# Patient Record
Sex: MALE | Race: WHITE | Employment: UNEMPLOYED | URBAN - METROPOLITAN AREA
[De-identification: names, ages, dates, MRNs, and addresses within clinical notes are randomized per-mention and may not be internally consistent; named-entity substitution may affect disease eponyms.]

---

## 2017-03-07 RX ORDER — DICLOFENAC SODIUM 10 MG/G
GEL TOPICAL
Qty: 500 G | Refills: 5 | Status: SHIPPED | OUTPATIENT
Start: 2017-03-07 | End: 2017-04-06

## 2017-03-23 ENCOUNTER — OFFICE VISIT (OUTPATIENT)
Dept: PAIN MANAGEMENT | Age: 57
End: 2017-03-23

## 2017-03-23 VITALS
DIASTOLIC BLOOD PRESSURE: 92 MMHG | BODY MASS INDEX: 30.71 KG/M2 | HEART RATE: 76 BPM | SYSTOLIC BLOOD PRESSURE: 155 MMHG | WEIGHT: 214 LBS

## 2017-03-23 DIAGNOSIS — M79.18 MUSCULOSKELETAL PAIN: ICD-10-CM

## 2017-03-23 DIAGNOSIS — M43.16 SPONDYLOLISTHESIS OF LUMBAR REGION: ICD-10-CM

## 2017-03-23 DIAGNOSIS — M47.816 LUMBAR FACET ARTHROPATHY: ICD-10-CM

## 2017-03-23 DIAGNOSIS — M51.26 LUMBAR DISC HERNIATION: ICD-10-CM

## 2017-03-23 DIAGNOSIS — Z79.899 ENCOUNTER FOR LONG-TERM (CURRENT) DRUG USE: ICD-10-CM

## 2017-03-23 DIAGNOSIS — G89.29 INSOMNIA SECONDARY TO CHRONIC PAIN: ICD-10-CM

## 2017-03-23 DIAGNOSIS — M96.1 POSTLAMINECTOMY SYNDROME, LUMBAR REGION: ICD-10-CM

## 2017-03-23 DIAGNOSIS — M48.061 LUMBAR FORAMINAL STENOSIS: ICD-10-CM

## 2017-03-23 DIAGNOSIS — M25.512 CHRONIC LEFT SHOULDER PAIN: ICD-10-CM

## 2017-03-23 DIAGNOSIS — R20.2 NUMBNESS AND TINGLING IN LEFT HAND: ICD-10-CM

## 2017-03-23 DIAGNOSIS — Z79.899 ENCOUNTER FOR LONG-TERM (CURRENT) USE OF MEDICATIONS: Primary | ICD-10-CM

## 2017-03-23 DIAGNOSIS — G89.29 CHRONIC LEFT SHOULDER PAIN: ICD-10-CM

## 2017-03-23 DIAGNOSIS — R20.0 NUMBNESS AND TINGLING IN LEFT HAND: ICD-10-CM

## 2017-03-23 DIAGNOSIS — M75.40 SHOULDER IMPINGEMENT SYNDROME, UNSPECIFIED LATERALITY: ICD-10-CM

## 2017-03-23 DIAGNOSIS — M77.8 SHOULDER ENTHESOPATHY, UNSPECIFIED LATERALITY: ICD-10-CM

## 2017-03-23 DIAGNOSIS — R53.81 OTHER MALAISE AND FATIGUE: ICD-10-CM

## 2017-03-23 DIAGNOSIS — R53.83 OTHER MALAISE AND FATIGUE: ICD-10-CM

## 2017-03-23 DIAGNOSIS — G47.01 INSOMNIA SECONDARY TO CHRONIC PAIN: ICD-10-CM

## 2017-03-23 LAB
ALCOHOL UR POC: NORMAL
AMPHETAMINES UR POC: NEGATIVE
BARBITURATES UR POC: NEGATIVE
BENZODIAZEPINES UR POC: NEGATIVE
BUPRENORPHINE UR POC: NORMAL
CANNABINOIDS UR POC: NEGATIVE
CARISOPRODOL UR POC: NORMAL
COCAINE UR POC: NEGATIVE
FENTANYL UR POC: NORMAL
MDMA/ECSTASY UR POC: NEGATIVE
METHADONE UR POC: NEGATIVE
METHAMPHETAMINE UR POC: NEGATIVE
METHYLPHENIDATE UR POC: NEGATIVE
OPIATES UR POC: NEGATIVE
OXYCODONE UR POC: NEGATIVE
PHENCYCLIDINE UR POC: NORMAL
PROPOXYPHENE UR POC: NORMAL
TRAMADOL UR POC: NORMAL
TRICYCLICS UR POC: NEGATIVE

## 2017-03-23 RX ORDER — HYDROCODONE BITARTRATE AND ACETAMINOPHEN 10; 325 MG/1; MG/1
1 TABLET ORAL
Qty: 120 TAB | Refills: 0 | Status: SHIPPED | OUTPATIENT
Start: 2017-03-25 | End: 2017-03-23 | Stop reason: SDUPTHER

## 2017-03-23 RX ORDER — METHADONE HYDROCHLORIDE 10 MG/1
20 TABLET ORAL EVERY 6 HOURS
Qty: 240 TAB | Refills: 0 | Status: SHIPPED | OUTPATIENT
Start: 2017-03-25 | End: 2017-03-23 | Stop reason: SDUPTHER

## 2017-03-23 RX ORDER — HYDROCODONE BITARTRATE AND ACETAMINOPHEN 10; 325 MG/1; MG/1
1 TABLET ORAL
Qty: 120 TAB | Refills: 0 | Status: SHIPPED | OUTPATIENT
Start: 2017-05-22 | End: 2017-06-20 | Stop reason: SDUPTHER

## 2017-03-23 RX ORDER — HYDROCODONE BITARTRATE AND ACETAMINOPHEN 10; 325 MG/1; MG/1
1 TABLET ORAL
Qty: 120 TAB | Refills: 0 | Status: SHIPPED | OUTPATIENT
Start: 2017-04-23 | End: 2017-03-23 | Stop reason: SDUPTHER

## 2017-03-23 RX ORDER — METHADONE HYDROCHLORIDE 10 MG/1
20 TABLET ORAL EVERY 6 HOURS
Qty: 240 TAB | Refills: 0 | Status: SHIPPED | OUTPATIENT
Start: 2017-05-22 | End: 2017-06-20 | Stop reason: SDUPTHER

## 2017-03-23 RX ORDER — METHADONE HYDROCHLORIDE 10 MG/1
20 TABLET ORAL EVERY 6 HOURS
Qty: 240 TAB | Refills: 0 | Status: SHIPPED | OUTPATIENT
Start: 2017-04-23 | End: 2017-03-23 | Stop reason: SDUPTHER

## 2017-03-23 NOTE — MR AVS SNAPSHOT
Visit Information Date & Time Provider Department Dept. Phone Encounter #  
 3/23/2017  3:00 PM Jesica Sloan [address] [place] [address] for Pain Management [phone] [id number] Follow-up Instructions Return in about 3 months (around 6/23/2017). Follow-up and Disposition History Your Appointments 4/13/2017  3:30 PM  
EMG with Colin Sims MD  
[address] [place] [address] Pain Management [facility]) Appt Note: Also schedule left upper extremity EMG with ALFONSO gross  Dx: R20.2  
 [address] [facility] [address]  
[phone] [address]  
  
    
 6/20/2017  1:00 PM  
Follow Up with Colin Sims MD  
[address] [place] [address] Pain Management [facility]) Appt Note: return in 3 months [address] [facility] [address]  
[phone]  [facility] [address] Upcoming Health Maintenance Date Due DTaP/Tdap/Td series (1 - Tdap) 10/24/1982 FOBT Q 1 YEAR AGE 50-75 10/24/2011 INFLUENZA AGE 9 TO ADULT 8/1/2016 Allergies as of 3/23/2017  Review Complete On: 3/23/2017 By: Georgiana Babinski, LPN No Known Allergies Current Immunizations  Never Reviewed No immunizations on file. Not reviewed this visit You Were Diagnosed With   
  
 Codes Comments Encounter for long-term (current) use of medications    -  Primary ICD-10-CM: Q24.266 ICD-9-CM: V58.69 Encounter for long-term (current) drug use     ICD-10-CM: Z79.899 ICD-9-CM: V58.69 Shoulder enthesopathy, unspecified laterality     ICD-10-CM: M75.80 ICD-9-CM: 726.10 Other malaise and fatigue     ICD-10-CM: R53.81, R53.83 ICD-9-CM: 780.79 Musculoskeletal pain     ICD-10-CM: M79.1 ICD-9-CM: 729.1 Postlaminectomy syndrome, lumbar region     ICD-10-CM: M96.1 ICD-9-CM: 722.83 Shoulder impingement syndrome, unspecified laterality     ICD-10-CM: M75.40 ICD-9-CM: 726.2 Spondylolisthesis of lumbar region     ICD-10-CM: M43.16 
ICD-9-CM: 738.4 Lumbar facet arthropathy (HCC)     ICD-10-CM: M12.88 ICD-9-CM: 721.3 Lumbar foraminal stenosis     ICD-10-CM: M99.83 ICD-9-CM: 724.02 Lumbar disc herniation     ICD-10-CM: M51.26 
ICD-9-CM: 722.10 Insomnia secondary to chronic pain     ICD-10-CM: G89.29, G47.01 
ICD-9-CM: 338.29, 327.01 Chronic left shoulder pain     ICD-10-CM: M25.512, G89.29 ICD-9-CM: 719.41, 338.29 Numbness and tingling in left hand     ICD-10-CM: R20.2 ICD-9-CM: 247. 0 Vitals BP Pulse Weight(growth percentile) BMI Smoking Status (!) 155/92 (BP 1 Location: Left arm, BP Patient Position: Sitting) 76 214 lb (97.1 kg) 30.71 kg/m2 Never Smoker Vitals History BMI and BSA Data Body Mass Index Body Surface Area 30.71 kg/m 2 2.19 m 2 Preferred Pharmacy Pharmacy Name Phone Shanta Nix Nancy [facility] 97. Your Updated Medication List  
  
   
This list is accurate as of: 3/23/17  3:57 PM.  Always use your most recent med list.  
  
  
  
  
 EFFEXOR  mg capsule Generic drug:  venlafaxine-SR Take  by mouth daily. * ergocalciferol 50,000 unit capsule Commonly known as:  ERGOCALCIFEROL  
TAKE 1 CAPSULE BY MOUTH ONCE A WEEK FOR 30 DAYS * ergocalciferol 50,000 unit capsule Commonly known as:  ERGOCALCIFEROL  
TAKE 1 CAPSULE BY MOUTH TWO TIMES A WEEK FOR 30 DAYS HYDROcodone-acetaminophen  mg tablet Commonly known as:  Chrissie3 Jayda Davis Take 1 Tab by mouth four (4) times daily as needed for Pain (breakthrough) for up to 30 days. Max Daily Amount: 4 Tabs. Indications: Pain Start taking on:  5/22/2017 * LIDODERM 5 % Generic drug:  lidocaine 1 Patch by TransDERmal route every twenty-four (24) hours. * lidocaine 5 % Commonly known as:  Yany Getting  
 3 Patches by TransDERmal route every twelve (12) hours. Apply patch to the affected area for 12 hours a day and remove for 12 hours a day. methadone 10 mg tablet Commonly known as:  DOLOPHINE Take 2 Tabs by mouth every six (6) hours for 30 days. Indications: Chronic Pain, Severe Pain Start taking on:  5/22/2017 NEURONTIN 600 mg tablet Generic drug:  gabapentin Take  by mouth three (3) times daily. VOLTAREN 1 % Gel Generic drug:  diclofenac TOPICALLY APPLY 4 GRAMS TO AFFECTED AREA FOUR TIMES A DAY FOR 30 DAYS  
  
 XANAX 0.5 mg tablet Generic drug:  ALPRAZolam  
Take  by mouth. ZANAFLEX 2 mg tablet Generic drug:  tiZANidine Take 1 mg by mouth as needed. ZANTAC 150 mg tablet Generic drug:  raNITIdine Take 150 mg by mouth two (2) times a day. * Notice: This list has 4 medication(s) that are the same as other medications prescribed for you. Read the directions carefully, and ask your doctor or other care provider to review them with you. Prescriptions Printed Refills  
 methadone (DOLOPHINE) 10 mg tablet 0 Starting on: 5/22/2017 Sig: Take 2 Tabs by mouth every six (6) hours for 30 days. Indications: Chronic Pain, Severe Pain Class: Print Route: Oral  
 HYDROcodone-acetaminophen (NORCO)  mg tablet 0 Starting on: 5/22/2017 Sig: Take 1 Tab by mouth four (4) times daily as needed for Pain (breakthrough) for up to 30 days. Max Daily Amount: 4 Tabs. Indications: Pain Class: Print Route: Oral  
  
We Performed the Following AMB POC DRUG SCREEN () [ \[facility] [place] [facility]\""] DRUG SCREEN [LJF99877 Custom] REFERRAL TO ORTHOPEDICS [DRL975 Custom] Comments:  
 Mr. Ann Zambrano requests an evaluation of persistent left shoulder pain. He had arthroscopic shoulder surgery (right) with Dr. Charles Morgan several years ago, and reports excellent relief of similar symptoms. Thank you in advance for again seeing this pleasant patient. Follow-up Instructions Return in about 3 months (around 6/23/2017). Referral Information Referral ID Referred By Referred To  
  
 [id number] Sandra Mccarthy MD   
   [address] [place] [address] Lorena Hamilton Phone: [phone] Fax: [phone] Visits Status Start Date End Date 12 New Request 3/23/17 3/23/18 If your referral has a status of pending review or denied, additional information will be sent to support the outcome of this decision. Patient Instructions Plan: 
Continue same medications as prescribed for chronic pain We will arrange an EMG to evaluate left hand numbness Consider B6 supplementation ([email or web address] is a reputable vitamin retailer) We will also refer you to Dr. Odell Palmer for evaluation of worsening left shoulder pain Follow up in 3 months or sooner if needed Regular exercise and attention to emotional health and diet remain the most effective ways to treat chronic pain of all kinds--keep up the great work! You may contact me with questions or concerns through [address] Patient Instructions History Introducing [facility] & HEALTH SERVICES! Dear Demetrice Mg: Thank you for requesting a [employer] account. Our records indicate that you already have an active [employer] account. You can access your account anytime at [email or web address] Did you know that you can access your hospital and ER discharge instructions at any time in [employer]? You can also review all of your test results from your hospital stay or ER visit. Additional Information If you have questions, please visit the Frequently Asked Questions section of the [employer] website at [email or web address] Remember, [employer] is NOT to be used for urgent needs. For medical emergencies, dial 911. Now available from your iPhone and Android! Please provide this summary of care documentation to your next provider. Your primary care clinician is listed as MARIO GRIDER. If you have any questions after today's visit, please call [phone].

## 2017-03-23 NOTE — PROGRESS NOTES
HISTORY OF PRESENT ILLNESS  Mynor Sauceda is a 55 y.o. male. Patient presents for follow up of chronic, severe, widespread pain due to shoulder impingement syndrome, lumbar postlaminectomy syndrome, and knee osteoarthritis. He also has many elements of fibromyalgia, including poor sleep, daytime fatigue, memory problems, and anxiety. He is present with his wife. He reports that he is trying to integrate more exercise and social interactions to his day to day activities, to hopefully improve his pain and quality of life. 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 Sleep is still poor, which is chronic for him, and has persisted in spite of multiple medications and improvements in sleep hygiene practices. He complains of worsening left shoulder pain over the past several weeks. Pain predominates at the anterolateral aspect of the shoulder near the [facility] [place] by [facility] joint and is worse with extension and overhead extension. He reports that this pain is very similar to pain he had in the right shoulder several years ago, and ultimately had successful arthroscopic surgery to \"clean out the joint\". We discussed that these symptoms are consistent with [facility] [place] arthritis/impingement syndrome. He requests to go back to Dr. Charles Morgan for evaluation and management. He also complains of worsening left hand numbness for the past three month. This tends to worsen during the night and with prolonged use of the arm. He denies overt  strength weakness, but does note that it is interfering with his ability to play guitar. He asks to have this evaluated via EMG. Wrist bracing and B6 supplementation were discussed. He continues to endorse persistent pain in the thoracic spine over the past several months, in addition to chronic lumbar pain. This pain is described as point-tender, aching, and stabbing.  When the pain flares in the thoracic spine, he will often notice stabbing, sharp pain at the base of the sternum near the xyphoid process. Recent MRI of the thoracic spine, which revealed mild multilevel degenerative changes. We discussed treatment options at length, and advised him to consider procedures such as epidurals or RFA if pain does not improve. Pt reports average of 5-7/10 pain scale most days. Medications continue to work well for pain control overall. Pooja Funez is tolerating medications well, with no untoward side effects noted. He is able to stay more active with less discomfort with these current doses. The patient reports an average of 50% relief with this regimen. Most recent UDS and  were consistent with prescribed medications. Pill counts are appropriate. He is informed of side effects, risks, and benefits of this regimen, and emphasizes that he derives a significant improvement in functionality and quality of life, and notes that non-opioid medications and therapies in the past have not offered significant benefit. He denies new or worsening insomnia or constipation issues. He denies any falls, injuries, or hospitalizations since the last visit. A total of 40 minutes was spent with the patient of which more than 50% of the time was spent counseling the patient. HPI--see above    ROS  Constitutional: Negative. HENT: Positive for neck pain. Negative for hearing loss. Eyes: Negative. Respiratory: Negative. Cardiovascular: Negative. Negative for chest pain, palpitations and leg swelling. Gastrointestinal: Negative. Negative for nausea, vomiting, abdominal pain, diarrhea and constipation. Genitourinary: Negative. Musculoskeletal: Positive for myalgias, back pain and joint pain (R shoulder, knees, hands). Skin: Negative. Neurological: Positive for tingling (R leg, resolved). Negative for dizziness, seizures, loss of consciousness and headaches.    Psychiatric/Behavioral: Positive for depression (on Effexor). Negative for suicidal ideas, hallucinations and memory loss. The patient has insomnia (pain related). Physical Exam  Constitutional: He is oriented to person, place, and time. He appears distressed. HENT:   Head: Normocephalic and atraumatic. Right Ear: External ear normal.   Left Ear: External ear normal.   Nose: Nose normal.   Mouth/Throat: Oropharynx is clear and moist. No oropharyngeal exudate. Eyes: Conjunctivae and EOM are normal. Pupils are equal, round, and reactive to light. Right eye exhibits no discharge. Left eye exhibits no discharge. No scleral icterus. Neck: Spinous process tenderness and muscular tenderness (spasms) present. Decreased range of motion present. No thyromegaly present. Cardiovascular: Normal rate, regular rhythm and normal heart sounds. Pulmonary/Chest: Effort normal and breath sounds normal. No respiratory distress. He has no wheezes. He has no rales. Abdominal: Soft. He exhibits no distension. There is no tenderness. There is no rebound and no guarding. Musculoskeletal: He exhibits tenderness at anterolateral aspect of left shoulder. No loss of ROM. Pain with overhead extension. No crepitus noted       Lumbar back: He exhibits decreased range of motion, tenderness, pain and spasm. tenderness at posteromedial aspect of left calcaneous. No swelling or redness noted. No fallen arch noted  Neurological: He is alert and oriented to person, place, and time. He has normal reflexes. No cranial nerve deficit or sensory deficit. He exhibits normal muscle tone. Gait abnormal. Coordination normal. Positive Tinel's sign on left wrist. Negative Phalen's. 5/5 muscle strength of left hand  ASSESSMENT and PLAN  Encounter Diagnoses   Name Primary?     Encounter for long-term (current) use of medications Yes    Encounter for long-term (current) drug use     Shoulder enthesopathy, unspecified laterality     Other malaise and fatigue     Musculoskeletal pain     Postlaminectomy syndrome, lumbar region     Shoulder impingement syndrome, unspecified laterality     Spondylolisthesis of lumbar region     Lumbar facet arthropathy (HCC)     Lumbar foraminal stenosis     Lumbar disc herniation     Insomnia secondary to chronic pain     Chronic left shoulder pain     Numbness and tingling in left hand        Plan:  Continue same medications as prescribed for chronic pain  We will arrange an EMG to evaluate left hand numbness  Consider B6 supplementation ([employer] is a [employer] vitamin retailer)  We will also refer you to Dr. Lurdes Rivera for evaluation of worsening left shoulder pain  Follow up in 3 months or sooner if needed  Regular exercise and attention to emotional health and diet remain the most effective ways to treat chronic pain of all kinds--keep up the great work! You may contact me with questions or concerns through [employer]   Plan:  Continue same medications as prescribed for chronic pain  We will arrange an EMG to evaluate left hand numbness  Consider B6 supplementation ([employer] is a [employer] vitamin retailer)  We will also refer you to Dr. Lurdes Rivera for evaluation of worsening left shoulder pain  Follow up in 3 months or sooner if needed  Regular exercise and attention to emotional health and diet remain the most effective ways to treat chronic pain of all kinds--keep up the great work!   You may contact me with questions or concerns through [address]

## 2017-03-23 NOTE — PATIENT INSTRUCTIONS
Plan:  Continue same medications as prescribed for chronic pain  We will arrange an EMG to evaluate left hand numbness  Consider B6 supplementation (www.[email or web address] is a reputable vitamin retailer)  We will also refer you to Dr. Darrell Cesar for evaluation of worsening left shoulder pain  Follow up in 3 months or sooner if needed  Regular exercise and attention to emotional health and diet remain the most effective ways to treat chronic pain of all kinds--keep up the great work!   You may contact me with questions or concerns through [address]

## 2017-04-13 ENCOUNTER — OFFICE VISIT (OUTPATIENT)
Dept: PAIN MANAGEMENT | Age: 57
End: 2017-04-13

## 2017-04-13 VITALS
DIASTOLIC BLOOD PRESSURE: 82 MMHG | WEIGHT: 215 LBS | HEART RATE: 79 BPM | SYSTOLIC BLOOD PRESSURE: 131 MMHG | BODY MASS INDEX: 30.85 KG/M2

## 2017-04-13 DIAGNOSIS — G56.03 BILATERAL CARPAL TUNNEL SYNDROME: Primary | ICD-10-CM

## 2017-04-13 NOTE — PROGRESS NOTES
See scanned report    [address] [place] [address],[address] PAIN MANAGEMENT  OFFICE PROCEDURE PROGRESS NOTE        Chart reviewed for the following:   Tomasa Artis MD, have reviewed the History, Physical and updated the Allergic reactions for Austen GODINEZ Ring     TIME OUT performed immediately prior to start of procedure:   Page Curling, M.D. have performed the following reviews on [facility] prior to the start of the procedure:            * Patient was identified by name and date of birth   * Agreement on procedure being performed was verified  * Risks and Benefits explained to the patient  * Procedure site verified and marked as necessary  * Patient was positioned for comfort  * Consent was signed and verified     Time: 4:34 PM       Date of procedure: 4/13/2017    Procedure performed by:  Chiara Gibson MD    Assisted by:patient    How tolerated by patient: tolerated the procedure well with no complications    Comments: none     Patient Label  Signature:_____________________________      EMG/NCV LUE: Mild - Mod CTS

## 2017-06-20 ENCOUNTER — OFFICE VISIT (OUTPATIENT)
Dept: PAIN MANAGEMENT | Age: 57
End: 2017-06-20

## 2017-06-20 VITALS — WEIGHT: 215 LBS | BODY MASS INDEX: 30.85 KG/M2

## 2017-06-20 DIAGNOSIS — G89.29 INSOMNIA SECONDARY TO CHRONIC PAIN: ICD-10-CM

## 2017-06-20 DIAGNOSIS — G47.01 INSOMNIA SECONDARY TO CHRONIC PAIN: ICD-10-CM

## 2017-06-20 DIAGNOSIS — M51.26 LUMBAR DISC HERNIATION: ICD-10-CM

## 2017-06-20 DIAGNOSIS — M48.061 LUMBAR FORAMINAL STENOSIS: ICD-10-CM

## 2017-06-20 DIAGNOSIS — M96.1 POSTLAMINECTOMY SYNDROME, LUMBAR REGION: Primary | ICD-10-CM

## 2017-06-20 DIAGNOSIS — M62.838 SPASM OF MUSCLE: ICD-10-CM

## 2017-06-20 DIAGNOSIS — Z79.899 ENCOUNTER FOR LONG-TERM (CURRENT) DRUG USE: ICD-10-CM

## 2017-06-20 DIAGNOSIS — G89.4 CHRONIC PAIN SYNDROME: ICD-10-CM

## 2017-06-20 DIAGNOSIS — M15.9 PRIMARY OSTEOARTHRITIS INVOLVING MULTIPLE JOINTS: ICD-10-CM

## 2017-06-20 DIAGNOSIS — M47.816 LUMBAR FACET ARTHROPATHY: ICD-10-CM

## 2017-06-20 RX ORDER — METHADONE HYDROCHLORIDE 10 MG/1
20 TABLET ORAL EVERY 6 HOURS
Qty: 240 TAB | Refills: 0 | Status: SHIPPED | OUTPATIENT
Start: 2017-08-18 | End: 2017-09-19 | Stop reason: SDUPTHER

## 2017-06-20 RX ORDER — NALOXONE HYDROCHLORIDE 4 MG/.1ML
4 SPRAY NASAL AS NEEDED
Qty: 1 BOX | Refills: 1 | Status: SHIPPED | OUTPATIENT
Start: 2017-06-20

## 2017-06-20 RX ORDER — HYDROCODONE BITARTRATE AND ACETAMINOPHEN 10; 325 MG/1; MG/1
1 TABLET ORAL
Qty: 120 TAB | Refills: 0 | Status: SHIPPED | OUTPATIENT
Start: 2017-07-20 | End: 2017-08-19

## 2017-06-20 RX ORDER — METHADONE HYDROCHLORIDE 10 MG/1
20 TABLET ORAL EVERY 6 HOURS
Qty: 240 TAB | Refills: 0 | Status: SHIPPED | OUTPATIENT
Start: 2017-06-22 | End: 2017-07-22

## 2017-06-20 RX ORDER — HYDROCODONE BITARTRATE AND ACETAMINOPHEN 10; 325 MG/1; MG/1
1 TABLET ORAL
Qty: 120 TAB | Refills: 0 | Status: SHIPPED | OUTPATIENT
Start: 2017-06-22 | End: 2017-07-22

## 2017-06-20 RX ORDER — METHADONE HYDROCHLORIDE 10 MG/1
20 TABLET ORAL EVERY 6 HOURS
Qty: 240 TAB | Refills: 0 | Status: SHIPPED | OUTPATIENT
Start: 2017-07-20 | End: 2017-08-19

## 2017-06-20 RX ORDER — HYDROCODONE BITARTRATE AND ACETAMINOPHEN 10; 325 MG/1; MG/1
1 TABLET ORAL
Qty: 120 TAB | Refills: 0 | Status: SHIPPED | OUTPATIENT
Start: 2017-08-18 | End: 2017-09-19 | Stop reason: SDUPTHER

## 2017-06-20 NOTE — PROGRESS NOTES
Nursing Notes    Patient presents to the office today in follow-up. Patient rates his pain at 7/10 on the numerical pain scale. Reviewed medications with counts as follows:    Rx Date filled Qty Dispensed Pill Count Last Dose Short   Methadone 10 5/24/17 240 32 6/20/17 no   norco 10 5/24/17 120 25 6/20/17 no       Comments:     POC UDS was not performed in office today    Any new labs or imaging since last appointment? YES, rib xray  Have you been to an emergency room (ER) or urgent care clinic since your last visit? NO            Have you been hospitalized since your last visit? NO     If yes, where, when, and reason for visit? Have you seen or consulted any other health care providers outside of the [address] [place] [address]  since your last visit? YES     If yes, where, when, and reason for visit? PCP after fall    Mr. Jerrod Rizvi has a reminder for a \"due or due soon\" health maintenance. I have asked that he contact his primary care provider for follow-up on this health maintenance.

## 2017-06-20 NOTE — MR AVS SNAPSHOT
Visit Information Date & Time Provider Department Dept. Phone Encounter #  
 6/20/2017  1:00 PM Chaya Pierce MD [address] [place] [address] for Pain Management [phone] Follow-up Instructions Return in about 3 months (around 9/20/2017). Upcoming Health Maintenance Date Due DTaP/Tdap/Td series (1 - Tdap) 10/24/1982 FOBT Q 1 YEAR AGE 50-75 10/24/2011 INFLUENZA AGE 9 TO ADULT 8/1/2017 Allergies as of 6/20/2017  Review Complete On: 6/20/2017 By: Chaya Pierce MD  
 No Known Allergies Current Immunizations  Never Reviewed No immunizations on file. Not reviewed this visit You Were Diagnosed With   
  
 Codes Comments Encounter for long-term (current) drug use    -  Primary ICD-10-CM: T55.125 ICD-9-CM: V58.69 Vitals Weight(growth percentile) BMI Smoking Status 215 lb (97.5 kg) 30.85 kg/m2 Never Smoker BMI and BSA Data Body Mass Index Body Surface Area  
 30.85 kg/m 2 2.19 m 2 Preferred Pharmacy Pharm[facility] Name Phone [facility]. Your Updated Medication List  
  
   
This list is accurate as of: 6/20/17  2:33 PM.  Always use your most recent med list.  
  
  
  
  
 EFFEXOR  mg capsule Generic drug:  venlafaxine-SR Take  by mouth daily. ergocalciferol 50,000 unit capsule Commonly known as:  ERGOCALCIFEROL  
TAKE 1 CAPSULE BY MOUTH ONCE A WEEK FOR 30 DAYS  
  
 * HYDROcodone-acetaminophen  mg tablet Commonly known as:  [place] Rivas Take 1 Tab by mouth four (4) times daily as needed for Pain (breakthrough) for up to 30 days. Max Daily Amount: 4 Tabs. Indications: Pain Start taking on:  6/22/2017  
  
 * HYDROcodone-acetaminophen  mg tablet Commonly known as:  [place] Rivas Take 1 Tab by mouth four (4) times daily as needed for Pain (breakthrough) for up to 30 days. Max Daily Amount: 4 Tabs. Indications: Pain Start taking on:  7/20/2017  
  
 * HYDROcodone-acetaminophen  mg tablet Commonly known as:  [place] Take 1 Tab by mouth four (4) times daily as needed for Pain (breakthrough) for up to 30 days. Max Daily Amount: 4 Tabs. Indications: Pain Start taking on:  8/18/2017 * LIDODERM 5 % Generic drug:  lidocaine 1 Patch by TransDERmal route every twenty-four (24) hours. * lidocaine 5 % Commonly known as:  LIDODERM  
3 Patches by TransDERmal route every twelve (12) hours. Apply patch to the affected area for 12 hours a day and remove for 12 hours a day. * methadone 10 mg tablet Commonly known as:  DOLOPHINE Take 2 Tabs by mouth every six (6) hours for 30 days. Indications: Chronic Pain, Severe Pain Start taking on:  6/22/2017 * methadone 10 mg tablet Commonly known as:  DOLOPHINE Take 2 Tabs by mouth every six (6) hours for 30 days. Indications: Chronic Pain, Severe Pain Start taking on:  7/20/2017 * methadone 10 mg tablet Commonly known as:  DOLOPHINE Take 2 Tabs by mouth every six (6) hours for 30 days. Indications: Chronic Pain, Severe Pain Start taking on:  8/18/2017  
  
 naloxone 4 mg/actuation Spry 4 mg by Nasal route as needed for up to 2 doses. Indications: OPIOID TOXICITY  
  
 NEURONTIN 600 mg tablet Generic drug:  gabapentin Take  by mouth three (3) times daily. XANAX 0.5 mg tablet Generic drug:  ALPRAZolam  
Take  by mouth. ZANAFLEX 2 mg tablet Generic drug:  tiZANidine Take 1 mg by mouth as needed. ZANTAC 150 mg tablet Generic drug:  raNITIdine Take 150 mg by mouth two (2) times a day. * Notice: This list has 8 medication(s) that are the same as other medications prescribed for you. Read the directions carefully, and ask your doctor or other care provider to review them with you. Prescriptions Printed Refills HYDROcodone-acetaminophen (NORCO)  mg tablet 0 Starting on: 2017 Sig: Take 1 Tab by mouth four (4) times daily as needed for Pain (breakthrough) for up to 30 days. Max Daily Amount: 4 Tabs. Indications: Pain Class: Print Route: Oral  
 methadone (DOLOPHINE) 10 mg tablet 0 Starting on: 2017 Sig: Take 2 Tabs by mouth every six (6) hours for 30 days. Indications: Chronic Pain, Severe Pain Class: Print Route: Oral  
 methadone (DOLOPHINE) 10 mg tablet 0 Starting on: 2017 Sig: Take 2 Tabs by mouth every six (6) hours for 30 days. Indications: Chronic Pain, Severe Pain Class: Print Route: Oral  
 HYDROcodone-acetaminophen (NORCO)  mg tablet 0 Starting on: 2017 Sig: Take 1 Tab by mouth four (4) times daily as needed for Pain (breakthrough) for up to 30 days. Max Daily Amount: 4 Tabs. Indications: Pain Class: Print Route: Oral  
 methadone (DOLOPHINE) 10 mg tablet 0 Starting on: 2017 Sig: Take 2 Tabs by mouth every six (6) hours for 30 days. Indications: Chronic Pain, Severe Pain Class: Print Route: Oral  
 HYDROcodone-acetaminophen (NORCO)  mg tablet 0 Starting on: 2017 Sig: Take 1 Tab by mouth four (4) times daily as needed for Pain (breakthrough) for up to 30 days. Max Daily Amount: 4 Tabs. Indications: Pain Class: Print Route: Oral  
 naloxone 4 mg/actuation spry 1 Si mg by Nasal route as needed for up to 2 doses. Indications: OPIOID TOXICITY Class: Print Route: Nasal  
  
We Performed the Following AMB POC EKG ROUTINE W/ 12 LEADS, INTER & REP [57284 CPT(R)] Follow-up Instructions Return in about 3 months (around 2017). Patient Instructions Current health maintenance issues were reviewed and the patient was advised to followup with his/her PCP for completion of these items. Introducing [facility]! Dear Patrick Ramirez: Thank you for requesting a [employer] account. Our records indicate that you already have an active [employer] account. You can access your account anytime at [email or web address] Did you know that you can access your hospital and ER discharge instructions at any time in [employer]? You can also review all of your test results from your hospital stay or ER visit. Additional Information If you have questions, please visit the Frequently Asked Questions section of the [employer] website at [email or web address] Remember, [employer] is NOT to be used for urgent needs. For medical emergencies, dial 911. Now available from your iPhone and Android! Please provide this summary of care documentation to your next provider. Your primary care clinician is listed as MARIO GRIDER. If you have any questions after today's visit, please call [phone].

## 2017-06-20 NOTE — PROGRESS NOTES
HISTORY OF PRESENT ILLNESS  Renny Navarro is a 55 y.o. male. HPI Patient presents for follow up of chronic, severe, widespread pain due to shoulder impingement syndrome, lumbar postlaminectomy syndrome, and knee osteoarthritis. He continues to endorse rather severe pain in the thoracic spine over the past several months. This pain is described as point-tender, aching, and stabbing. When the pain flares in the thoracic spine, he will often notice stabbing, sharp pain at the base of the sternum near the xyphoid process. We discussed that this may represent HNP or disc bulge/DDD of the thoracic spine producing radicular pain in the chest wall. We discussed his recent MRI of the thoracic spine, which revealed mild multilevel degenerative changes. We discussed treatment options at length, and advised him to consider procedures such as epidurals or RFA if pain does not improve. Pt reports average of 7/10 pain scale most days. Since last seen, he sustained a fall on the stairs resulting in fractured ribs and straining of the intercostal muscles. He is slowly improving. Medications offer little to modest pain relief. Renny Navarro is tolerating medications well, with no untoward side effects noted. He is able to stay more active with less discomfort with these current doses. The patient reports an average of 70% relief with this regimen. Most recent UDS and  were consistent with prescribed medications. Pill counts are appropriate. He is informed of side effects, risks, and benefits of this regimen, and emphasizes that he derives a significant improvement in functionality and quality of life, and notes that non-opioid medications and therapies in the past have not offered significant benefit. Pain level today 7 out of 10, outcome 14/28,(The lower the upper number, the better the outcome)  Physical activity and mobility as well as mood are fair, sleep is poor. No reported side effects.     A current review of the [facility] does not identify any inconsistency. Review of Systems   Constitutional: Negative. HENT: Negative for hearing loss. Eyes: Negative. Respiratory: Negative. Cardiovascular: Negative. Negative for chest pain, palpitations and leg swelling. Gastrointestinal: Negative. Negative for abdominal pain, constipation, diarrhea, nausea and vomiting. Genitourinary: Negative. Musculoskeletal: Positive for back pain, joint pain (R shoulder, knees, hands), myalgias and neck pain. Skin: Negative. Neurological: Positive for tingling (R leg, resolved). Negative for dizziness, seizures, loss of consciousness and headaches. Psychiatric/Behavioral: Positive for depression (on Effexor). Negative for hallucinations, memory loss and suicidal ideas. The patient has insomnia (pain related). All other systems reviewed and are negative. Physical Exam   Constitutional: He is oriented to person, place, and time. He appears distressed. HENT:   Head: Normocephalic and atraumatic. Right Ear: External ear normal.   Left Ear: External ear normal.   Nose: Nose normal.   Mouth/Throat: Oropharynx is clear and moist. No oropharyngeal exudate. Eyes: Conjunctivae and EOM are normal. Pupils are equal, round, and reactive to light. Right eye exhibits no discharge. Left eye exhibits no discharge. No scleral icterus. Neck: Spinous process tenderness and muscular tenderness (spasms) present. Decreased range of motion present. No thyromegaly present. Cardiovascular: Normal rate, regular rhythm and normal heart sounds. Pulmonary/Chest: Effort normal and breath sounds normal. No respiratory distress. He has no wheezes. He has no rales. Abdominal: Soft. He exhibits no distension. There is no tenderness. There is no rebound and no guarding. Musculoskeletal: He exhibits tenderness. Right shoulder: He exhibits decreased range of motion, tenderness and pain.         Left shoulder: He exhibits decreased range of motion, tenderness and pain. Right elbow: He exhibits decreased range of motion. Tenderness (diffuse) found. Left elbow: He exhibits decreased range of motion. Tenderness (crepitus) found. Right wrist: He exhibits decreased range of motion, tenderness, bony tenderness and crepitus. Left wrist: He exhibits decreased range of motion, tenderness, bony tenderness and crepitus. Right knee: He exhibits decreased range of motion (crepitus) and bony tenderness. Left knee: He exhibits decreased range of motion (crepitus) and bony tenderness. Right ankle: He exhibits decreased range of motion. Tenderness. Left ankle: He exhibits decreased range of motion. Tenderness. Lumbar back: He exhibits decreased range of motion, tenderness, pain and spasm. Neurological: He is alert and oriented to person, place, and time. He has normal reflexes. No cranial nerve deficit or sensory deficit. He exhibits normal muscle tone. Gait abnormal. Coordination normal.   Reflex Scores:       Tricep reflexes are 2+ on the right side and 2+ on the left side. Bicep reflexes are 2+ on the right side and 2+ on the left side. Brachioradialis reflexes are 2+ on the right side and 2+ on the left side. Patellar reflexes are 2+ on the right side and 2+ on the left side. Achilles reflexes are 2+ on the right side and 2+ on the left side. Skin: Skin is warm. No rash noted. Psychiatric: He has a normal mood and affect. His behavior is normal. Judgment and thought content normal.   Nursing note and vitals reviewed. ASSESSMENT and PLAN  Encounter Diagnoses   Name Primary?     Postlaminectomy syndrome, lumbar region Yes    Encounter for long-term (current) drug use     Spasm of muscle     Primary osteoarthritis involving multiple joints     Lumbar facet arthropathy (HCC)     Lumbar foraminal stenosis     Lumbar disc herniation     Insomnia secondary to chronic pain     Chronic pain syndrome      An EKG will be obtained today to monitor his chronic methadone usage. He will continue on his current analgesic regimen as this is providing good pain control with improved functionality and minimal side effects. Because the patient's current regimen places him/her at increased risk for possible overdose, a prescription for naloxone nasal spray is being provided. The patient understands that this medication is only to be used in the setting of a possible overdose and that inadvertent use of this medication could precipitate overt withdrawal.    3 month reassess him    No concerns are raised for misuse, abuse, or diversion. 1. Pain medications are prescribed with the objective of pain relief and improved physical and psychosocial function in this patient. 2. Counseled patient on proper use of prescribed medications and reviewed opioid contract. 3. Counseled patient about chronic medical conditions and their relationship to anxiety and depression and recommended mental health support as needed. 4. Reviewed with patient self-help tools, home exercise, and lifestyle changes to assist the patient in self-management of symptoms. 5. Advised patient to have a primary care provider to continue care for health maintenance and general medical conditions and support for referral to specialty care as needed. 6. Reviewed with patient the treatment plan, goals of treatment plan, and limitations of treatment plan, to include the potential for side effects from medications and procedures. If side effects occur, it is the responsibility of the patient to inform the clinic so that a change in the treatment plan can be made in a safe manner. The patient is advised that stopping prescribed medication may cause an increase in symptoms and possible medication withdrawal symptoms. The patient is informed an emergency room evaluation may be necessary if this occurs.   DISPOSITION: The patients condition and plan were discussed at length and all questions were answered. The patient agrees with the plan.     Counseling occupied > 50% of visit:  Total time: 40 minutes

## 2017-08-21 RX ORDER — TIZANIDINE 2 MG/1
TABLET ORAL
Qty: 90 TAB | Refills: 4 | Status: SHIPPED | OUTPATIENT
Start: 2017-08-21

## 2017-09-19 RX ORDER — METHADONE HYDROCHLORIDE 10 MG/1
20 TABLET ORAL EVERY 6 HOURS
Qty: 240 TAB | Refills: 0 | Status: SHIPPED | OUTPATIENT
Start: 2017-09-25 | End: 2017-10-13 | Stop reason: SDUPTHER

## 2017-09-19 RX ORDER — HYDROCODONE BITARTRATE AND ACETAMINOPHEN 10; 325 MG/1; MG/1
1 TABLET ORAL
Qty: 120 TAB | Refills: 0 | Status: SHIPPED | OUTPATIENT
Start: 2017-09-25 | End: 2017-10-13 | Stop reason: SDUPTHER

## 2017-09-19 NOTE — TELEPHONE ENCOUNTER
The pt was called to be informed that the provider will not be in the office for his appt. The pt was not available but I was able to speak to his spouse. She was on the pt's HIPPA list. The pt was rescheduled to 10/13/17 with [facility]. A  was obtained and the last fill date for his methadone and norco was 08/27/17. There were no aberrancies noted. The pt's wife states that he is tolerating his medication well at this time. He will be provided prescriptions to get him to this appt. The pt will need to provide a UDS when he comes to  his prescriptions.

## 2017-10-13 ENCOUNTER — OFFICE VISIT (OUTPATIENT)
Dept: PAIN MANAGEMENT | Age: 57
End: 2017-10-13

## 2017-10-13 VITALS
SYSTOLIC BLOOD PRESSURE: 144 MMHG | HEART RATE: 72 BPM | HEIGHT: 70 IN | BODY MASS INDEX: 30.78 KG/M2 | WEIGHT: 215 LBS | TEMPERATURE: 97.4 F | RESPIRATION RATE: 18 BRPM | DIASTOLIC BLOOD PRESSURE: 86 MMHG

## 2017-10-13 DIAGNOSIS — M51.26 LUMBAR DISC HERNIATION: ICD-10-CM

## 2017-10-13 DIAGNOSIS — M47.816 LUMBAR FACET ARTHROPATHY: ICD-10-CM

## 2017-10-13 DIAGNOSIS — M77.8 SHOULDER ENTHESOPATHY, UNSPECIFIED LATERALITY: ICD-10-CM

## 2017-10-13 DIAGNOSIS — G89.29 INSOMNIA SECONDARY TO CHRONIC PAIN: ICD-10-CM

## 2017-10-13 DIAGNOSIS — Z79.899 ENCOUNTER FOR LONG-TERM (CURRENT) DRUG USE: ICD-10-CM

## 2017-10-13 DIAGNOSIS — M43.16 SPONDYLOLISTHESIS OF LUMBAR REGION: ICD-10-CM

## 2017-10-13 DIAGNOSIS — M48.061 LUMBAR FORAMINAL STENOSIS: ICD-10-CM

## 2017-10-13 DIAGNOSIS — G47.01 INSOMNIA SECONDARY TO CHRONIC PAIN: ICD-10-CM

## 2017-10-13 DIAGNOSIS — Z79.899 ENCOUNTER FOR LONG-TERM (CURRENT) USE OF HIGH-RISK MEDICATION: Primary | ICD-10-CM

## 2017-10-13 DIAGNOSIS — M75.40 IMPINGEMENT SYNDROME OF SHOULDER REGION, UNSPECIFIED LATERALITY: ICD-10-CM

## 2017-10-13 LAB
ALCOHOL UR POC: NORMAL
AMPHETAMINES UR POC: NORMAL
BARBITURATES UR POC: NEGATIVE
BENZODIAZEPINES UR POC: NEGATIVE
BUPRENORPHINE UR POC: NEGATIVE
CANNABINOIDS UR POC: NEGATIVE
CARISOPRODOL UR POC: NORMAL
COCAINE UR POC: NEGATIVE
FENTANYL UR POC: NORMAL
MDMA/ECSTASY UR POC: NEGATIVE
METHADONE UR POC: NORMAL
METHAMPHETAMINE UR POC: NEGATIVE
METHYLPHENIDATE UR POC: NORMAL
OPIATES UR POC: NEGATIVE
OXYCODONE UR POC: NEGATIVE
PHENCYCLIDINE UR POC: NEGATIVE
PROPOXYPHENE UR POC: NORMAL
TRAMADOL UR POC: NORMAL
TRICYCLICS UR POC: NEGATIVE

## 2017-10-13 RX ORDER — METHADONE HYDROCHLORIDE 10 MG/1
20 TABLET ORAL EVERY 6 HOURS
Qty: 240 TAB | Refills: 0 | Status: SHIPPED | OUTPATIENT
Start: 2017-12-22 | End: 2018-01-26 | Stop reason: SDUPTHER

## 2017-10-13 RX ORDER — HYDROCODONE BITARTRATE AND ACETAMINOPHEN 10; 325 MG/1; MG/1
1 TABLET ORAL
Qty: 120 TAB | Refills: 0 | Status: SHIPPED | OUTPATIENT
Start: 2017-11-23 | End: 2017-10-13 | Stop reason: SDUPTHER

## 2017-10-13 RX ORDER — METHADONE HYDROCHLORIDE 10 MG/1
20 TABLET ORAL EVERY 6 HOURS
Qty: 240 TAB | Refills: 0 | Status: SHIPPED | OUTPATIENT
Start: 2017-11-23 | End: 2017-10-13 | Stop reason: SDUPTHER

## 2017-10-13 RX ORDER — HYDROCODONE BITARTRATE AND ACETAMINOPHEN 10; 325 MG/1; MG/1
1 TABLET ORAL
Qty: 120 TAB | Refills: 0 | Status: SHIPPED | OUTPATIENT
Start: 2017-10-25 | End: 2017-10-13 | Stop reason: SDUPTHER

## 2017-10-13 RX ORDER — HYDROCODONE BITARTRATE AND ACETAMINOPHEN 10; 325 MG/1; MG/1
1 TABLET ORAL
Qty: 120 TAB | Refills: 0 | Status: SHIPPED | OUTPATIENT
Start: 2017-12-22 | End: 2018-01-26 | Stop reason: SDUPTHER

## 2017-10-13 RX ORDER — METHADONE HYDROCHLORIDE 10 MG/1
20 TABLET ORAL EVERY 6 HOURS
Qty: 240 TAB | Refills: 0 | Status: SHIPPED | OUTPATIENT
Start: 2017-10-25 | End: 2017-10-13 | Stop reason: SDUPTHER

## 2017-10-13 NOTE — PROGRESS NOTES
HISTORY OF PRESENT ILLNESS  Bree Ferrer is a 55 y.o. male. Patient presents for follow up of chronic, severe, widespread pain due to shoulder impingement syndrome, lumbar postlaminectomy syndrome, and knee osteoarthritis. He also has many elements of fibromyalgia, including poor sleep, daytime fatigue, memory problems, and anxiety. He is present with his wife. He reports that his pain has remained under good control over the past several months. He continues to endorse persistent pain in the lumbar spine, left arm/shoulder pain, and knee pain. However, he has been able to exercise more consistently, and finds that this has been helpful for his overall quality of life and functionality. Pt reports average of 5/10 pain scale most days with medications. He denies any new symptoms. Medications continue to work well for pain control overall. Bree Ferrer is tolerating medications well, with no untoward side effects noted. He is able to stay more active with less discomfort with these current doses. The patient reports an average of 80% relief with this regimen. Most recent UDS and  were consistent with prescribed medications. Pill counts are appropriate. He is informed of side effects, risks, and benefits of this regimen, and emphasizes that he derives a significant improvement in functionality and quality of life, and notes that non-opioid medications and therapies in the past have not offered significant benefit. We also discussed the departure of Dr. Bisi Lynch and myself from the practice and discussed at length \"next steps\". He chooses to stay with the practice for the time being but may choose to follow Dr. Bisi Lynch if this practice declines to continue this current regimen. He is very worried that his doses will be decreased in the future, noting that he has been stable on these doses for many years. We discussed his concerns at length. He denies new or worsening insomnia or constipation issues.  He denies any falls, injuries, or hospitalizations since the last visit. A total of 40 minutes was spent with the patient of which more than 50% of the time was spent counseling the patient. HPI--see above    ROS  Constitutional: Negative. HENT: Positive for neck pain. Negative for hearing loss. Eyes: Negative. Respiratory: Negative. Cardiovascular: Negative. Negative for chest pain, palpitations and leg swelling. Gastrointestinal: Negative. Negative for nausea, vomiting, abdominal pain, diarrhea and constipation. Genitourinary: Negative. Musculoskeletal: Positive for myalgias, back pain and joint pain (R shoulder, knees, hands). Skin: Negative. Neurological: Positive for tingling (R leg, resolved). Negative for dizziness, seizures, loss of consciousness and headaches. Psychiatric/Behavioral: Positive for depression (on Effexor). Negative for suicidal ideas, hallucinations and memory loss. The patient has insomnia (pain related). Physical Exam  Constitutional: He is oriented to person, place, and time. He appears distressed. HENT:   Head: Normocephalic and atraumatic. Right Ear: External ear normal.   Left Ear: External ear normal.   Nose: Nose normal.   Mouth/Throat: Oropharynx is clear and moist. No oropharyngeal exudate. Eyes: Conjunctivae and EOM are normal. Pupils are equal, round, and reactive to light. Right eye exhibits no discharge. Left eye exhibits no discharge. No scleral icterus. Neck: Spinous process tenderness and muscular tenderness (spasms) present. Decreased range of motion present. No thyromegaly present. Cardiovascular: Normal rate, regular rhythm and normal heart sounds. Pulmonary/Chest: Effort normal and breath sounds normal. No respiratory distress. He has no wheezes. He has no rales. Abdominal: Soft. He exhibits no distension. There is no tenderness. There is no rebound and no guarding.    Musculoskeletal: He exhibits tenderness at anterolateral aspect of left shoulder. No loss of ROM. Pain with overhead extension. No crepitus noted       Lumbar back: He exhibits decreased range of motion, tenderness, pain and spasm. tenderness at posteromedial aspect of left calcaneous. No swelling or redness noted. No fallen arch noted  Neurological: He is alert and oriented to person, place, and time. He has normal reflexes. No cranial nerve deficit or sensory deficit. He exhibits normal muscle tone. Gait abnormal. Coordination normal. Positive Tinel's sign on left wrist. Negative Phalen's. 5/5 muscle strength of left hand  ASSESSMENT and PLAN    ICD-10-CM ICD-9-CM    1. Encounter for long-term (current) use of high-risk medication Z79.899 V58.69 DRUG SCREEN      AMB POC DRUG SCREEN ()   2. Encounter for long-term (current) drug use Z79.899 V58.69    3. Shoulder enthesopathy, unspecified laterality M75.80 726.10    4. Impingement syndrome of shoulder region, unspecified laterality M75.40 726.2    5. Spondylolisthesis of lumbar region M43.16 738.4    6. Lumbar facet arthropathy M12.88 721.3    7. Lumbar foraminal stenosis M99.83 724.02    8. Lumbar disc herniation M51.26 722.10    9.  Insomnia secondary to chronic pain G89.29 338.29     G47.01 327.01       Plan:  Continue same medications as prescribed for chronic pain  Follow up in 3 months or sooner if needed  Consider transitioning care to Dr. Nathaniel Cook at his new practice, with Dr. Jerod Linder in [facility], [address]  Phone: [phone]  Fax: [phone]  Regular exercise and attention to emotional health and diet remain the most effective ways to treat chronic pain of all kinds  You may contact me with questions or concerns through [address]

## 2017-10-13 NOTE — MR AVS SNAPSHOT
Visit Information Date & Time Provider Department Dept. Phone Encounter #  
 10/13/2017  1:20 PM Kanu Blanc [address] [place] [address] Pain Management [phone] Follow-up Instructions Return in about 3 months (around 1/13/2018). Follow-up and Disposition History Upcoming Health Maintenance Date Due DTaP/Tdap/Td series (1 - Tdap) 10/24/1982 FOBT Q 1 YEAR AGE 50-75 10/24/2011 INFLUENZA AGE 9 TO ADULT 8/1/2017 Allergies as of 10/13/2017  Review Complete On: 10/13/2017 By: Jonathan Baker LPN No Known Allergies Current Immunizations  Never Reviewed No immunizations on file. Not reviewed this visit You Were Diagnosed With   
  
 Codes Comments Encounter for long-term (current) use of high-risk medication    -  Primary ICD-10-CM: N12.951 ICD-9-CM: V58.69 Encounter for long-term (current) drug use     ICD-10-CM: Z79.899 ICD-9-CM: V58.69 Shoulder enthesopathy, unspecified laterality     ICD-10-CM: M75.80 ICD-9-CM: 726.10 Impingement syndrome of shoulder region, unspecified laterality     ICD-10-CM: M75.40 ICD-9-CM: 726.2 Spondylolisthesis of lumbar region     ICD-10-CM: M43.16 
ICD-9-CM: 738.4 Lumbar facet arthropathy     ICD-10-CM: M12.88 ICD-9-CM: 721.3 Lumbar foraminal stenosis     ICD-10-CM: M99.83 ICD-9-CM: 724.02 Lumbar disc herniation     ICD-10-CM: M51.26 
ICD-9-CM: 722.10 Insomnia secondary to chronic pain     ICD-10-CM: G89.29, G47.01 
ICD-9-CM: 338.29, 327.01 Vitals BP Pulse Temp Resp Height(growth percentile) Weight(growth percentile) 144/86 72 97.4 °F (36.3 °C) 18 5' 10\" (1.778 m) 215 lb (97.5 kg) BMI Smoking Status 30.85 kg/m2 Never Smoker BMI and BSA Data Body Mass Index Body Surface Area  
 30.85 kg/m 2 2.19 m 2 Preferred Pharmacy Pharm[facility] Name Phone [facility] [address], [place] [facility]. Your Updated Medication List  
  
   
This list is accurate as of: 10/13/17  2:37 PM.  Always use your most recent med list.  
  
  
  
  
 EFFEXOR  mg capsule Generic drug:  venlafaxine-SR Take  by mouth daily. ergocalciferol 50,000 unit capsule Commonly known as:  ERGOCALCIFEROL  
TAKE 1 CAPSULE BY MOUTH ONCE A WEEK FOR 30 DAYS HYDROcodone-acetaminophen  mg tablet Commonly known as:  Yoko Arts Take 1 Tab by mouth four (4) times daily as needed for Pain (breakthrough) for up to 30 days. Max Daily Amount: 4 Tabs. Indications: Pain Start taking on:  12/22/2017 * LIDODERM 5 % Generic drug:  lidocaine 1 Patch by TransDERmal route every twenty-four (24) hours. * lidocaine 5 % Commonly known as:  LIDODERM  
3 Patches by TransDERmal route every twelve (12) hours. Apply patch to the affected area for 12 hours a day and remove for 12 hours a day. methadone 10 mg tablet Commonly known as:  DOLOPHINE Take 2 Tabs by mouth every six (6) hours for 30 days. Indications: Chronic Pain, Severe Pain Start taking on:  12/22/2017  
  
 naloxone 4 mg/actuation nasal spray Commonly known as:  NARCAN  
4 mg by Nasal route as needed for up to 2 doses. Indications: OPIOID TOXICITY  
  
 NEURONTIN 600 mg tablet Generic drug:  gabapentin Take  by mouth three (3) times daily. XANAX 0.5 mg tablet Generic drug:  ALPRAZolam  
Take  by mouth. * ZANAFLEX 2 mg tablet Generic drug:  tiZANidine Take 1 mg by mouth as needed. * tiZANidine 2 mg tablet Commonly known as:  Jannine Bridges TAKE ONE-HALF TO ONE TABLET BY MOUTH THREE TIMES A DAY AS NEEDED FOR MUSCLE SPASM  
  
 ZANTAC 150 mg tablet Generic drug:  raNITIdine Take 150 mg by mouth two (2) times a day. * Notice:   This list has 4 medication(s) that are the same as other medications prescribed for you. Read the directions carefully, and ask your doctor or other care provider to review them with you. Prescriptions Printed Refills HYDROcodone-acetaminophen (NORCO)  mg tablet 0 Starting on: 12/22/2017 Sig: Take 1 Tab by mouth four (4) times daily as needed for Pain (breakthrough) for up to 30 days. Max Daily Amount: 4 Tabs. Indications: Pain Class: Print Route: Oral  
 methadone (DOLOPHINE) 10 mg tablet 0 Starting on: 12/22/2017 Sig: Take 2 Tabs by mouth every six (6) hours for 30 days. Indications: Chronic Pain, Severe Pain Class: Print Route: Oral  
  
We Performed the Following AMB POC DRUG SCREEN () [ [facility]] DRUG SCREEN [[id number] Custom] Follow-up Instructions Return in about 3 months (around 1/13/2018). Patient Instructions Plan: 
Continue same medications as prescribed for chronic pain Follow up in 3 months or sooner if needed Consider transitioning care to Dr. Mike Essex at his new practice, with Dr. Celeste Morris in [facility] 16. [address] Phone: [phone] Fax: [phone] Regular exercise and attention to emotional health and diet remain the most effective ways to treat chronic pain of all kinds You may contact me with questions or concerns through [address] [facility]! Dear Paola Primer: Thank you for requesting a [employer] account. Our records indicate that you already have an active [employer] account. You can access your account anytime at [email or web address] Did you know that you can access your hospital and ER discharge instructions at any time in [employer]? You can also review all of your test results from your hospital stay or ER visit. Additional Information If you have questions, please visit the Frequently Asked Questions section of the [employer] website at [email or web address] Remember, [employer] is NOT to be used for urgent needs. For medical emergencies, dial 911. Now available from your iPhone and Android! Please provide this summary of care documentation to your next provider. Your primary care clinician is listed as Elvia Tracy. If you have any questions after today's visit, please call [phone].

## 2017-10-13 NOTE — PATIENT INSTRUCTIONS
Plan:  Continue same medications as prescribed for chronic pain  Follow up in 3 months or sooner if needed  Consider transitioning care to Dr. Desirae Gomes at his new practice, with Dr. Lavern Galdamez in [facility] 79, [address]   Phone: [phone]  Fax: [phone]  Regular exercise and attention to emotional health and diet remain the most effective ways to treat chronic pain of all kinds  You may contact me with questions or concerns through [address]

## 2018-01-26 ENCOUNTER — OFFICE VISIT (OUTPATIENT)
Dept: PAIN MANAGEMENT | Age: 58
End: 2018-01-26

## 2018-01-26 VITALS
HEART RATE: 79 BPM | BODY MASS INDEX: 30.78 KG/M2 | SYSTOLIC BLOOD PRESSURE: 131 MMHG | WEIGHT: 215 LBS | TEMPERATURE: 98.7 F | HEIGHT: 70 IN | RESPIRATION RATE: 18 BRPM | DIASTOLIC BLOOD PRESSURE: 82 MMHG

## 2018-01-26 DIAGNOSIS — M62.838 SPASM OF MUSCLE: ICD-10-CM

## 2018-01-26 DIAGNOSIS — G89.4 CHRONIC PAIN SYNDROME: ICD-10-CM

## 2018-01-26 DIAGNOSIS — M48.061 LUMBAR FORAMINAL STENOSIS: ICD-10-CM

## 2018-01-26 DIAGNOSIS — M96.1 POSTLAMINECTOMY SYNDROME, LUMBAR REGION: ICD-10-CM

## 2018-01-26 DIAGNOSIS — M47.816 LUMBAR FACET ARTHROPATHY: ICD-10-CM

## 2018-01-26 DIAGNOSIS — M79.18 MUSCULOSKELETAL PAIN: ICD-10-CM

## 2018-01-26 DIAGNOSIS — Z79.899 ENCOUNTER FOR LONG-TERM (CURRENT) DRUG USE: ICD-10-CM

## 2018-01-26 DIAGNOSIS — M15.9 PRIMARY OSTEOARTHRITIS INVOLVING MULTIPLE JOINTS: Primary | ICD-10-CM

## 2018-01-26 RX ORDER — HYDROCODONE BITARTRATE AND ACETAMINOPHEN 10; 325 MG/1; MG/1
1 TABLET ORAL
Qty: 120 TAB | Refills: 0 | Status: SHIPPED | OUTPATIENT
Start: 2018-02-28 | End: 2018-03-30

## 2018-01-26 RX ORDER — METHADONE HYDROCHLORIDE 10 MG/1
20 TABLET ORAL EVERY 6 HOURS
Qty: 210 TAB | Refills: 0 | Status: SHIPPED | OUTPATIENT
Start: 2018-01-29 | End: 2018-02-28

## 2018-01-26 RX ORDER — HYDROCODONE BITARTRATE AND ACETAMINOPHEN 10; 325 MG/1; MG/1
1 TABLET ORAL
Qty: 120 TAB | Refills: 0 | Status: SHIPPED | OUTPATIENT
Start: 2018-03-27 | End: 2018-05-03 | Stop reason: SDUPTHER

## 2018-01-26 RX ORDER — METHADONE HYDROCHLORIDE 10 MG/1
20 TABLET ORAL EVERY 6 HOURS
Qty: 210 TAB | Refills: 0 | Status: SHIPPED | OUTPATIENT
Start: 2018-02-28 | End: 2018-03-30

## 2018-01-26 RX ORDER — METHADONE HYDROCHLORIDE 10 MG/1
20 TABLET ORAL EVERY 6 HOURS
Qty: 210 TAB | Refills: 0 | Status: SHIPPED | OUTPATIENT
Start: 2018-03-27 | End: 2018-05-03 | Stop reason: SDUPTHER

## 2018-01-26 RX ORDER — HYDROCODONE BITARTRATE AND ACETAMINOPHEN 10; 325 MG/1; MG/1
1 TABLET ORAL
Qty: 120 TAB | Refills: 0 | Status: SHIPPED | OUTPATIENT
Start: 2018-01-29 | End: 2018-02-28

## 2018-01-26 NOTE — PROGRESS NOTES
HISTORY OF PRESENT ILLNESS  Juma Bennett is a 65 y.o. male    Mr. Raul Sneed  presents for follow up of chronic, severe, widespread pain due to shoulder impingement syndrome, lumbar postlaminectomy syndrome, and knee osteoarthritis. He also has many elements of fibromyalgia, including poor sleep, daytime fatigue, memory problems, and anxiety. He is present with his wife. This is my first visit with this patient. The patient denies any significant changes in his chronic pain since last f/u. We discussed his current condition and medications in detail today. Mr. Raul Sneed is currently taking methadone 10 mg tablets 8 times daily we are going to begin a taper at this visit down to 7 tablets daily. This patient understands and agrees with this change today. Mr. Raul Sneed is otherwise doing very well and has no new complaints. He tolerates his medications without side effects. Austen Douglas reports no change in sleep or constipation. We discussed that Dr. Paty Rudolph is no longer working as a provider in this pain management practice. We discussed that this change may involve adjustments to currently prescribed pain medications. This patient is reminded that they have the option to transfer to another pain management clinic if desired. We will provide a neurology referral to continue migraine headache management if needed. This patient understands and verbally agrees. Last EK2017  QT/QTC: 370/384    Current medication management consists of:hydrocodone/acetaminophen 10/325 mg tablet, take 1 tablet 4 times daily as needed, adjust down to methadone 10 mg tablet, take 2 tablets every 6 hours (max 7 tablets)   Medications are helping with pain control and quality of life. His pain is 6-7/10 with medication and 10/10 without. Pt describes pain as aching, stabbing, and burning. Aggravating factors include standing, sitting, and walking. Relieved with rest, medication, and avoiding painful activities. The patient reports 50% relief with current medications. Current treatment is helping to improve general activity, mood, walking, sleep, enjoyment of life    Measuring clinical outcomes of chronic pain patients: score 13/28; the lower the number the better the outcome. Pain Meds and Quality Of Life have been reviewed. Nonpharmacologic therapy and non-opioid pharmacologic therapy were considered. If opioid therapy is prescribed, this is only if the expected benefits are anticipated to outweigh risks. He  is otherwise doing well with no other complaints today. He denies any adverse events including nausea, vomiting, dizziness, increased constipation, hallucinations, or seizures. The patient reports functional improvement and QOL with pain medication. Vitals:    01/26/18 1049   BP: 131/82   Pulse: 79   Resp: 18   Temp: 98.7 °F (37.1 °C)   TempSrc: Oral   Weight: 97.5 kg (215 lb)   Height: 5' 10\" (1.778 m)   PainSc:   7   PainLoc: Back         No Known Allergies    Past Surgical History:   Procedure Laterality Date    HX BACK SURGERY  Oct 2002    L5 and S1 fusion    HX LAP CHOLECYSTECTOMY  1995    HX SHOULDER ARTHROSCOPY  2007    Right    LAP,CHOLECYSTECTOMY         ROS   Constitutional: Negative. HENT: Positive for neck pain. Negative for hearing loss. Eyes: Negative. Respiratory: Negative. Cardiovascular: Negative. Negative for chest pain, palpitations and leg swelling. Gastrointestinal: Negative. Negative for nausea, vomiting, abdominal pain, diarrhea and constipation. Genitourinary: Negative. Musculoskeletal: Positive for myalgias, back pain and joint pain (R shoulder, knees, hands). Skin: Negative. Neurological: Positive for tingling (R leg, resolved). Negative for dizziness, seizures, loss of consciousness and headaches. Psychiatric/Behavioral: Positive for depression (on Effexor). Negative for suicidal ideas, hallucinations and memory loss. The patient has insomnia.       Physical Exam   Constitutional: He is oriented to person, place, and time. He appears distressed. HENT:   Head: Normocephalic and atraumatic. Right Ear: External ear normal.   Left Ear: External ear normal.   Nose: Nose normal.   Mouth/Throat: Oropharynx is clear and moist. No oropharyngeal exudate. Eyes: Conjunctivae and EOM are normal. Pupils are equal, round, and reactive to light. Right eye exhibits no discharge. Left eye exhibits no discharge. No scleral icterus. Neck: Spinous process tenderness and muscular tenderness (spasms) present. Decreased range of motion present. No thyromegaly present. Cardiovascular: Normal rate, regular rhythm and normal heart sounds. Pulmonary/Chest: Effort normal and breath sounds normal. No respiratory distress. He has no wheezes. He has no rales. Abdominal: Soft. He exhibits no distension. There is no tenderness. There is no rebound and no guarding. Musculoskeletal: He exhibits tenderness at anterolateral aspect of left shoulder. No loss of ROM. Pain with overhead extension. No crepitus noted       Lumbar back: He exhibits decreased range of motion, tenderness, pain and spasm. tenderness at posteromedial aspect of left calcaneous. No swelling or redness noted. No fallen arch noted  Neurological: He is alert and oriented to person, place, and time. He has normal reflexes. No cranial nerve deficit or sensory deficit. He exhibits normal muscle tone. Gait abnormal. Coordination normal. Positive Tinel's sign on left wrist. Negative Phalen's. 5/5 muscle strength of left hand    ASSESSMENT:    1. Primary osteoarthritis involving multiple joints    2. Postlaminectomy syndrome, lumbar region    3. Spasm of muscle    4. Musculoskeletal pain    5. Lumbar foraminal stenosis    6. Lumbar facet arthropathy    7. Chronic pain syndrome    8.  Encounter for long-term (current) drug use          [place] Prescription Monitoring Program was reviewed which does not demonstrate aberrancies and/or inconsistencies with regard to the historical prescribing of controlled medications to this patient by other providers. Medications were brought to visit today. Pill count was appropriate. When possible, non-drug therapy for chronic pain should be used as a first-line treatment. Physical therapy exercise regimens, chiropractic manipulation, meditation relaxation techniques, cognitive behavior therapy, acupuncture, yoga, Sheng Chi,  transcutaneous electrical nerve stimulation (TENS), and application of moist heat can help alleviate pain . Explained that realistic expectations and goals with chronic pain management are to maximize function and minimize pain with the understanding that limitations will exist both in the extent of relief that she may achieve, as well as thresholds of mg strengths that we will not exceed. Our role is to help the patient better cope with chronic pain utilizng a multimodal approach. The patients condition and plan were discussed. All questions were answered. The patient agrees with the plan. PLAN / Pt Instructions:  1. Modify current plan with no evidence of addiction or diversion. 2. Stable on current medication without adverse events. 3. Refill hydrocodone/acetaminophen 10/325 mg tablet, take 1 tablet 4 times daily as needed  4. Refill and adjust down to methadone 10 mg tablet, take 2 tablets every 6 hours (max 7 tablets)   5. Discussed risks of addiction, dependency, and opioid induced hyperalgesia. 6. Reviewed with patient benefits of home exercise, and lifestyle changes to assist the patient in self-management of symptoms. 7. Return to clinic in 3 months or sooner if needed      Prescription monitoring program reviewed. The patient  should keep track of total Tylenol intake and make sure liver function tests have been checked with primary care physician.     Pain medications prescribed with the objective of pain relief and improved physical and psychosocial function in this patient. DISPOSITION  · Counseled patient on proper use of prescribed medications. · Counseled patient about chronic medical conditions and their relationship to anxiety and depression and recommended mental health support as needed. · Reviewed with patient self-help tools, home exercise, and lifestyle changes to assist the patient in self-management of symptoms. · Reviewed with patient the treatment plan, goals of treatment plan, and limitations of treatment plan, to include the potential for side effects from medications and procedures. If side effects occur, it is the responsibility of the patient to inform the clinic so that a change in the treatment plan can be made in a safe manner. The patient is advised that stopping prescribed medication may cause an increase in symptoms and possible medication withdrawal symptoms. The patient is informed an emergency room evaluation may be necessary if this occurs. Spent 25 minutes with patient today reviewing the treatment plan, goals of treatment plan, and limitations of the treatment plan, to include the potential for side effects from medications and procedures. More than 50% of the visit time was spent counseling the patient. Derick Aviles PA-C 1/26/2018        Note: Please excuse any typographical errors. Voice recognition software was used for this note and may cause mistakes.

## 2018-01-26 NOTE — MR AVS SNAPSHOT
[address] [facility] [address] [phone] Patient: Thiago Doty MRN: [id number] :1960 Visit Information Date & Time Provider Department Dept. Phone Encounter #  
 2018 10:20 AM Jesus Maggie [address] for Pain Management [phone] Upcoming Health Maintenance Date Due DTaP/Tdap/Td series (1 - Tdap) 10/24/1982 FOBT Q 1 YEAR AGE 50-75 10/24/2011 Influenza Age 5 to Adult 2017 Allergies as of 2018  Review Complete On: 2018 By: Justin Russell No Known Allergies Current Immunizations  Never Reviewed No immunizations on file. Not reviewed this visit You Were Diagnosed With   
  
 Codes Comments Postlaminectomy syndrome, lumbar region    -  Primary ICD-10-CM: M96.1 ICD-9-CM: 722.83 Primary osteoarthritis involving multiple joints     ICD-10-CM: M15.0 ICD-9-CM: 715.09 Spasm of muscle     ICD-10-CM: R40.550 ICD-9-CM: 728.85 Musculoskeletal pain     ICD-10-CM: M79.1 ICD-9-CM: 729.1 Lumbar foraminal stenosis     ICD-10-CM: M99.83 ICD-9-CM: 724.02 Lumbar facet arthropathy     ICD-10-CM: M12.88 ICD-9-CM: 757. 3 Chronic pain syndrome     ICD-10-CM: G89.4 ICD-9-CM: 338.4 Encounter for long-term (current) drug use     ICD-10-CM: Z79.899 ICD-9-CM: V58.69 Vitals BP Pulse Temp Resp Height(growth percentile) Weight(growth percentile) 131/82 (BP 1 Location: Right arm, BP Patient Position: Sitting) 79 98.7 °F (37.1 °C) (Oral) 18 5' 10\" (1.778 m) 215 lb (97.5 kg) BMI Smoking Status 30.85 kg/m2 Never Smoker Vitals History BMI and BSA Data Body Mass Index Body Surface Area  
 30.85 kg/m 2 2.19 m 2 Preferred Pharmacy Pharmacy Name Phone [facility] [address], [address] [facility] 97. Your Updated Medication List  
  
   
 This list is accurate as of: 1/26/18 11:54 AM.  Always use your most recent med list.  
  
  
  
  
 EFFEXOR  mg capsule Generic drug:  venlafaxine-SR Take  by mouth daily. ergocalciferol 50,000 unit capsule Commonly known as:  ERGOCALCIFEROL  
TAKE 1 CAPSULE BY MOUTH ONCE A WEEK FOR 30 DAYS  
  
 * HYDROcodone-acetaminophen  mg tablet Commonly known as:  Clearnce Garima Take 1 Tab by mouth four (4) times daily as needed for Pain (breakthrough) for up to 30 days. Max Daily Amount: 4 Tabs. Indications: Pain Start taking on:  1/29/2018  
  
 * HYDROcodone-acetaminophen  mg tablet Commonly known as:  Clearnce [place] Take 1 Tab by mouth four (4) times daily as needed for Pain (breakthrough) for up to 30 days. Max Daily Amount: 4 Tabs. Indications: Pain Start taking on:  2/28/2018  
  
 * HYDROcodone-acetaminophen  mg tablet Commonly known as:  Clearnce [place] Take 1 Tab by mouth four (4) times daily as needed for Pain (breakthrough) for up to 30 days. Max Daily Amount: 4 Tabs. Indications: Pain Start taking on:  3/27/2018 * LIDODERM 5 % Generic drug:  lidocaine 1 Patch by TransDERmal route every twenty-four (24) hours. * lidocaine 5 % Commonly known as:  LIDODERM  
3 Patches by TransDERmal route every twelve (12) hours. Apply patch to the affected area for 12 hours a day and remove for 12 hours a day. * methadone 10 mg tablet Commonly known as:  DOLOPHINE Take 2 Tabs by mouth every six (6) hours for 30 days. Max 7 tablets  Indications: Chronic Pain, Severe Pain Start taking on:  1/29/2018 * methadone 10 mg tablet Commonly known as:  DOLOPHINE Take 2 Tabs by mouth every six (6) hours for 30 days. Max 7 tablets  Indications: Chronic Pain, Severe Pain Start taking on:  2/28/2018 * methadone 10 mg tablet Commonly known as:  DOLOPHINE Take 2 Tabs by mouth every six (6) hours for 30 days. Max 7 tab  Indications: Chronic Pain, Severe Pain Start taking on:  3/27/2018  
  
 naloxone 4 mg/actuation nasal spray Commonly known as:  NARCAN  
4 mg by Nasal route as needed for up to 2 doses. Indications: OPIOID TOXICITY  
  
 NEURONTIN 600 mg tablet Generic drug:  gabapentin Take  by mouth three (3) times daily. XANAX 0.5 mg tablet Generic drug:  ALPRAZolam  
Take  by mouth. * ZANAFLEX 2 mg tablet Generic drug:  tiZANidine Take 1 mg by mouth as needed. * tiZANidine 2 mg tablet Commonly known as:  Starlet Blush TAKE ONE-HALF TO ONE TABLET BY MOUTH THREE TIMES A DAY AS NEEDED FOR MUSCLE SPASM  
  
 ZANTAC 150 mg tablet Generic drug:  raNITIdine Take 150 mg by mouth two (2) times a day. * Notice: This list has 10 medication(s) that are the same as other medications prescribed for you. Read the directions carefully, and ask your doctor or other care provider to review them with you. Prescriptions Printed Refills HYDROcodone-acetaminophen (NORCO)  mg tablet 0 Starting on: 3/27/2018 Sig: Take 1 Tab by mouth four (4) times daily as needed for Pain (breakthrough) for up to 30 days. Max Daily Amount: 4 Tabs. Indications: Pain Class: Print Route: Oral  
 HYDROcodone-acetaminophen (NORCO)  mg tablet 0 Starting on: 2/28/2018 Sig: Take 1 Tab by mouth four (4) times daily as needed for Pain (breakthrough) for up to 30 days. Max Daily Amount: 4 Tabs. Indications: Pain Class: Print Route: Oral  
 methadone (DOLOPHINE) 10 mg tablet 0 Starting on: 3/27/2018 Sig: Take 2 Tabs by mouth every six (6) hours for 30 days. Max 7 tab  Indications: Chronic Pain, Severe Pain Class: Print Route: Oral  
 methadone (DOLOPHINE) 10 mg tablet 0 Starting on: 2/28/2018 Sig: Take 2 Tabs by mouth every six (6) hours for 30 days. Max 7 tablets  Indications: Chronic Pain, Severe Pain Class: Print  Route: Oral  
 HYDROcodone-acetaminophen (NORCO)  mg tablet 0  
 Starting on: 1/29/2018 Sig: Take 1 Tab by mouth four (4) times daily as needed for Pain (breakthrough) for up to 30 days. Max Daily Amount: 4 Tabs. Indications: Pain Class: Print Route: Oral  
 methadone (DOLOPHINE) 10 mg tablet 0 Starting on: 1/29/2018 Sig: Take 2 Tabs by mouth every six (6) hours for 30 days. Max 7 tablets  Indications: Chronic Pain, Severe Pain Class: Print Route: Oral  
  
Introducing [facility] SERVICES! Dear Sandy Gibson: Thank you for requesting a [employer] account. Our records indicate that you already have an active [employer] account. You can access your account anytime at [email or web address] Did you know that you can access your hospital and ER discharge instructions at any time in [employer]? You can also review all of your test results from your hospital stay or ER visit. Additional Information If you have questions, please visit the Frequently Asked Questions section of the [employer] website at [email or web address] Remember, [employer] is NOT to be used for urgent needs. For medical emergencies, dial 911. Now available from your iPhone and Android! Please provide this summary of care documentation to your next provider. Your primary care clinician is listed as Cecy Durand. If you have any questions after today's visit, please call [phone].

## 2018-01-26 NOTE — PROGRESS NOTES
Nursing Notes    Patient presents to the office today in follow-up. Patient rates his pain at 7/10 on the numerical pain scale. Reviewed medications with counts as follows:    Rx Date filled Qty Dispensed Pill Count Last Dose Short   Norco  mg 12/28/17 120 45 This a.m No   Tizanidine 2 mg+3 refill 01/17/18 90 81.5 This a.m no   Methadone 10 mg 12/28/17 240 52.5 This a.m no                        POC UDS was not performed in office today    Any new labs or imaging since last appointment? NO    Have you been to an emergency room (ER) or urgent care clinic since your last visit? NO            Have you been hospitalized since your last visit? NO     If yes, where, when, and reason for visit? Have you seen or consulted any other health care providers outside of the [address] [place] [address]  since your last visit? YES, PCP     If yes, where, when, and reason for visit? HM deferred to pcp.

## 2018-05-03 ENCOUNTER — OFFICE VISIT (OUTPATIENT)
Dept: PAIN MANAGEMENT | Age: 58
End: 2018-05-03

## 2018-05-03 VITALS
SYSTOLIC BLOOD PRESSURE: 158 MMHG | RESPIRATION RATE: 12 BRPM | TEMPERATURE: 97.7 F | HEART RATE: 74 BPM | BODY MASS INDEX: 30.78 KG/M2 | HEIGHT: 70 IN | WEIGHT: 215 LBS | DIASTOLIC BLOOD PRESSURE: 86 MMHG

## 2018-05-03 DIAGNOSIS — M51.26 LUMBAR DISC HERNIATION: ICD-10-CM

## 2018-05-03 DIAGNOSIS — G89.4 CHRONIC PAIN SYNDROME: Primary | ICD-10-CM

## 2018-05-03 DIAGNOSIS — M15.9 PRIMARY OSTEOARTHRITIS INVOLVING MULTIPLE JOINTS: ICD-10-CM

## 2018-05-03 DIAGNOSIS — M48.061 LUMBAR FORAMINAL STENOSIS: ICD-10-CM

## 2018-05-03 DIAGNOSIS — M96.1 POSTLAMINECTOMY SYNDROME, LUMBAR REGION: ICD-10-CM

## 2018-05-03 DIAGNOSIS — Z79.899 ENCOUNTER FOR LONG-TERM (CURRENT) USE OF HIGH-RISK MEDICATION: ICD-10-CM

## 2018-05-03 DIAGNOSIS — M47.816 LUMBAR FACET ARTHROPATHY: ICD-10-CM

## 2018-05-03 DIAGNOSIS — M43.16 SPONDYLOLISTHESIS OF LUMBAR REGION: ICD-10-CM

## 2018-05-03 DIAGNOSIS — M79.18 MUSCULOSKELETAL PAIN: ICD-10-CM

## 2018-05-03 LAB
ALCOHOL UR POC: NORMAL
AMPHETAMINES UR POC: NEGATIVE
BARBITURATES UR POC: NORMAL
BENZODIAZEPINES UR POC: NEGATIVE
BUPRENORPHINE UR POC: NEGATIVE
CANNABINOIDS UR POC: NEGATIVE
CARISOPRODOL UR POC: NORMAL
COCAINE UR POC: NEGATIVE
FENTANYL UR POC: NORMAL
MDMA/ECSTASY UR POC: NORMAL
METHADONE UR POC: NORMAL
METHAMPHETAMINE UR POC: NORMAL
METHYLPHENIDATE UR POC: NORMAL
OPIATES UR POC: NEGATIVE
OXYCODONE UR POC: NORMAL
PHENCYCLIDINE UR POC: NORMAL
PROPOXYPHENE UR POC: NORMAL
TRAMADOL UR POC: NORMAL
TRICYCLICS UR POC: NORMAL

## 2018-05-03 RX ORDER — METHADONE HYDROCHLORIDE 10 MG/1
20 TABLET ORAL EVERY 6 HOURS
Qty: 210 TAB | Refills: 0 | Status: SHIPPED | OUTPATIENT
Start: 2018-05-03 | End: 2018-05-31 | Stop reason: SDUPTHER

## 2018-05-03 RX ORDER — HYDROCODONE BITARTRATE AND ACETAMINOPHEN 10; 325 MG/1; MG/1
1 TABLET ORAL
Qty: 110 TAB | Refills: 0 | Status: SHIPPED | OUTPATIENT
Start: 2018-05-03 | End: 2018-05-31 | Stop reason: SDUPTHER

## 2018-05-03 RX ORDER — METHADONE HYDROCHLORIDE 10 MG/1
10 TABLET ORAL EVERY 4 HOURS
COMMUNITY
End: 2018-06-22 | Stop reason: SDUPTHER

## 2018-05-03 RX ORDER — HYDROCODONE BITARTRATE AND ACETAMINOPHEN 10; 325 MG/1; MG/1
1 TABLET ORAL
COMMUNITY
End: 2018-06-22 | Stop reason: SDUPTHER

## 2018-05-03 NOTE — PROGRESS NOTES
Nursing Notes    Patient presents to the office today in follow-up. Patient rates his pain at 7/10 on the numerical pain scale. Reviewed medications with counts as follows:    Rx Date filled Qty Dispensed Pill Count Last Dose Short   Norco  mg tab 4/2/18 120 12 TODAY No   Methadone  HCL 10 mg tab 4/2/18 210 2.5  TODAY No                                  Comments:       POC UDS was performed in office today per verbal order and correct read back from provider. Any new labs or imaging since last appointment? YES, Lab work. Have you been to an emergency room (ER) or urgent care clinic since your last visit? NO            Have you been hospitalized since your last visit? NO     If yes, where, when, and reason for visit? Have you seen or consulted any other health care providers outside of the [address] [place] [address]  since your last visit? YES, PCP. If yes, where, when, and reason for visit? Mr. Maday Powers has a reminder for a \"due or due soon\" health maintenance. I have asked that he contact his primary care provider for follow-up on this health maintenance.

## 2018-05-03 NOTE — MR AVS SNAPSHOT
[address] [address] 
[phone] Patient: Ashly Vizcaino MRN: [id number] :1960 Visit Information Date & Time Provider Department Dept. Phone Encounter #  
 5/3/2018 12:40 PM [facility] [phone] [id number] Upcoming Health Maintenance Date Due DTaP/Tdap/Td series (1 - Tdap) 10/24/1982 FOBT Q 1 YEAR AGE 50-75 10/24/2011 MEDICARE YEARLY EXAM 3/14/2018 Influenza Age 5 to Adult 2018 Allergies as of 5/3/2018  Review Complete On: 5/3/2018 By: Amanda Lemus PA-C No Known Allergies Current Immunizations  Never Reviewed No immunizations on file. Not reviewed this visit You Were Diagnosed With   
  
 Codes Comments Chronic pain syndrome    -  Primary ICD-10-CM: G89.4 ICD-9-CM: 338.4 Encounter for long-term (current) use of high-risk medication     ICD-10-CM: Z79.899 ICD-9-CM: V58.69 Spondylolisthesis of lumbar region     ICD-10-CM: M43.16 
ICD-9-CM: 738.4 Lumbar foraminal stenosis     ICD-10-CM: M99.83 ICD-9-CM: 724.02 Lumbar disc herniation     ICD-10-CM: M51.26 
ICD-9-CM: 722.10 Musculoskeletal pain     ICD-10-CM: M79.1 ICD-9-CM: 729.1 Postlaminectomy syndrome, lumbar region     ICD-10-CM: M96.1 ICD-9-CM: 722.83 Primary osteoarthritis involving multiple joints     ICD-10-CM: M15.0 ICD-9-CM: 715.09 Lumbar facet arthropathy (HCC)     ICD-10-CM: M46.96 
ICD-9-CM: 721.3 Vitals BP Pulse Temp Resp Height(growth percentile) Weight(growth percentile) 158/86 (BP 1 Location: Left arm, BP Patient Position: Sitting) 74 97.7 °F (36.5 °C) (Oral) 12 5' 10\" (1.778 m) 215 lb (97.5 kg) BMI Smoking Status 30.85 kg/m2 Never Smoker Vitals History BMI and BSA Data Body Mass Index Body Surface Area  
 30.85 kg/m 2 2.19 m 2 [facility]erred Pharmacy [facility] [address] [facility]. Your Updated Medication List  
  
   
This list is accurate as of 5/3/18  1:47 PM.  Always use your most recent med list.  
  
  
  
  
 DEPO-TESTOSTERONE IM  
0.5 mg by IntraMUSCular route Once every 2 weeks. DOCUSATE SODIUM PO Take  by mouth. EFFEXOR  mg capsule Generic drug:  venlafaxine-SR Take  by mouth daily. ergocalciferol 50,000 unit capsule Commonly known as:  ERGOCALCIFEROL  
TAKE 1 CAPSULE BY MOUTH ONCE A WEEK FOR 30 DAYS  
  
 * HYDROcodone-acetaminophen  mg tablet Commonly known as:  Marvelyn Code Take 1 Tab by mouth.  
  
 * HYDROcodone-acetaminophen  mg tablet Commonly known as:  Marvelyn Code Take 1 Tab by mouth four (4) times daily as needed for Pain (breakthrough) for up to 30 days. Max Daily Amount: 4 Tabs. Indications: Pain * LIDODERM 5 % Generic drug:  lidocaine 1 Patch by TransDERmal route every twenty-four (24) hours. * lidocaine 5 % Commonly known as:  LIDODERM  
3 Patches by TransDERmal route every twelve (12) hours. Apply patch to the affected area for 12 hours a day and remove for 12 hours a day. * methadone 10 mg tablet Commonly known as:  DOLOPHINE Take 10 mg by mouth every six (6) hours as needed for Pain. * methadone 10 mg tablet Commonly known as:  DOLOPHINE Take 2 Tabs by mouth every six (6) hours for 30 days. Max 7 tab  Indications: Chronic Pain, Severe Pain  
  
 naloxone 4 mg/actuation nasal spray Commonly known as:  NARCAN  
4 mg by Nasal route as needed for up to 2 doses. Indications: OPIOID TOXICITY  
  
 NEURONTIN 600 mg tablet Generic drug:  gabapentin Take  by mouth three (3) times daily. VITAMIN B-6 PO Take  by mouth. XANAX 0.5 mg tablet Generic drug:  ALPRAZolam  
Take  by mouth. * ZANAFLEX 2 mg tablet Generic drug:  tiZANidine Take 1 mg by mouth as needed. * tiZANidine 2 mg tablet Commonly known as:  Loreta Baez TAKE ONE-HALF TO ONE TABLET BY MOUTH THREE TIMES A DAY AS NEEDED FOR MUSCLE SPASM  
  
 ZANTAC 150 mg tablet Generic drug:  raNITIdine Take 150 mg by mouth two (2) times a day. * Notice: This list has 8 medication(s) that are the same as other medications prescribed for you. Read the directions carefully, and ask your doctor or other care provider to review them with you. Prescriptions Printed Refills HYDROcodone-acetaminophen (NORCO)  mg tablet 0 Sig: Take 1 Tab by mouth four (4) times daily as needed for Pain (breakthrough) for up to 30 days. Max Daily Amount: 4 Tabs. Indications: Pain Class: Print Route: Oral  
 methadone (DOLOPHINE) 10 mg tablet 0 Sig: Take 2 Tabs by mouth every six (6) hours for 30 days. Max 7 tab  Indications: Chronic Pain, Severe Pain Class: Print Route: Oral  
  
We Performed the Following AMB POC DRUG SCREEN () [ \[facility] [place] [facility]\""] DRUG SCREEN [[id number] Custom] Introducing [facility]! Dear Austin Montalvo: Thank you for requesting a [facility] account. Our records indicate that you already have an active [facility] account. You can access your account anytime at [email or web address] Did you know that you can access your hospital and ER discharge instructions at any time in [facility]? You can also review all of your test results from your hospital stay or ER visit. Additional Information If you have questions, please visit the Frequently Asked Questions section of the [facility] website at [email or web address] Remember, [facility] is NOT to be used for urgent needs. For medical emergencies, dial 911. Now available from your iPhone and Android! Please provide this summary of care documentation to your next provider. Your primary care clinician is listed as Yogesh Rubio.  If you have any questions after today's visit, please call [phone].

## 2018-05-29 NOTE — TELEPHONE ENCOUNTER
A female called the office to request a refill on the pt's meds. I called the pt at the only number listed in his chart and he was not able to be reached. A message was left for the pt asking him to call the office back at his earliest convenience. The number to the nurse triage line was provided.

## 2018-05-31 DIAGNOSIS — M43.16 SPONDYLOLISTHESIS OF LUMBAR REGION: ICD-10-CM

## 2018-05-31 DIAGNOSIS — M15.9 PRIMARY OSTEOARTHRITIS INVOLVING MULTIPLE JOINTS: ICD-10-CM

## 2018-05-31 DIAGNOSIS — G89.4 CHRONIC PAIN SYNDROME: ICD-10-CM

## 2018-05-31 DIAGNOSIS — M79.18 MUSCULOSKELETAL PAIN: ICD-10-CM

## 2018-05-31 DIAGNOSIS — M96.1 POSTLAMINECTOMY SYNDROME, LUMBAR REGION: ICD-10-CM

## 2018-05-31 DIAGNOSIS — M48.061 LUMBAR FORAMINAL STENOSIS: ICD-10-CM

## 2018-05-31 DIAGNOSIS — M51.26 LUMBAR DISC HERNIATION: ICD-10-CM

## 2018-05-31 DIAGNOSIS — M47.816 LUMBAR FACET ARTHROPATHY: ICD-10-CM

## 2018-05-31 RX ORDER — METHADONE HYDROCHLORIDE 10 MG/1
20 TABLET ORAL
Qty: 180 TAB | Refills: 0 | Status: SHIPPED | OUTPATIENT
Start: 2018-06-03 | End: 2018-06-22 | Stop reason: SDUPTHER

## 2018-05-31 RX ORDER — HYDROCODONE BITARTRATE AND ACETAMINOPHEN 10; 325 MG/1; MG/1
1 TABLET ORAL
Qty: 110 TAB | Refills: 0 | Status: SHIPPED | OUTPATIENT
Start: 2018-06-03 | End: 2018-07-03

## 2018-05-31 NOTE — TELEPHONE ENCOUNTER
Destini Murray has called requesting a refill of their controlled medication, norco 10/325mg and methadone 10mg, for the management of chronic generalized pain. Last office visit date: 05/03/18    Date last  was pulled and reviewed : 05/31/18    Was the patient compliant when the above report was pulled? yes    Analgesia: patient expresses 70% pain relief    Aberrancies: none noted    ADL's: patient expresses limited ability to execute adl care    Adverse Reaction: none noted    Provider's last note and plan of care reviewed? Yes    Patient noted to fill xanax on 03/31/18. Request forwarded to provider for review.

## 2018-06-22 ENCOUNTER — OFFICE VISIT (OUTPATIENT)
Dept: PAIN MANAGEMENT | Age: 58
End: 2018-06-22

## 2018-06-22 VITALS
HEART RATE: 70 BPM | RESPIRATION RATE: 12 BRPM | HEIGHT: 70 IN | WEIGHT: 210 LBS | DIASTOLIC BLOOD PRESSURE: 89 MMHG | SYSTOLIC BLOOD PRESSURE: 131 MMHG | TEMPERATURE: 95.6 F | BODY MASS INDEX: 30.06 KG/M2

## 2018-06-22 DIAGNOSIS — G89.4 CHRONIC PAIN SYNDROME: ICD-10-CM

## 2018-06-22 DIAGNOSIS — G89.29 CHRONIC MIDLINE LOW BACK PAIN, WITH SCIATICA PRESENCE UNSPECIFIED: Primary | ICD-10-CM

## 2018-06-22 DIAGNOSIS — M47.816 LUMBAR FACET ARTHROPATHY: ICD-10-CM

## 2018-06-22 DIAGNOSIS — M25.512 CHRONIC PAIN OF BOTH SHOULDERS: ICD-10-CM

## 2018-06-22 DIAGNOSIS — M25.511 CHRONIC PAIN OF BOTH SHOULDERS: ICD-10-CM

## 2018-06-22 DIAGNOSIS — M46.1 SACROILIITIS (HCC): ICD-10-CM

## 2018-06-22 DIAGNOSIS — G89.29 CHRONIC PAIN OF BOTH SHOULDERS: ICD-10-CM

## 2018-06-22 DIAGNOSIS — Z79.899 ENCOUNTER FOR LONG-TERM (CURRENT) USE OF HIGH-RISK MEDICATION: ICD-10-CM

## 2018-06-22 DIAGNOSIS — M51.34 DDD (DEGENERATIVE DISC DISEASE), THORACIC: ICD-10-CM

## 2018-06-22 DIAGNOSIS — M54.5 CHRONIC MIDLINE LOW BACK PAIN, WITH SCIATICA PRESENCE UNSPECIFIED: Primary | ICD-10-CM

## 2018-06-22 RX ORDER — VITAMIN E 268 MG
400 CAPSULE ORAL DAILY
COMMUNITY

## 2018-06-22 RX ORDER — METHADONE HYDROCHLORIDE 10 MG/1
20 TABLET ORAL
Qty: 150 TAB | Refills: 0 | Status: SHIPPED | OUTPATIENT
Start: 2018-07-02 | End: 2018-06-22 | Stop reason: CLARIF

## 2018-06-22 RX ORDER — METHADONE HYDROCHLORIDE 10 MG/1
TABLET ORAL
Qty: 180 TAB | Refills: 0 | Status: SHIPPED | OUTPATIENT
Start: 2018-07-02

## 2018-06-22 RX ORDER — LIDOCAINE 50 MG/G
PATCH TOPICAL
Qty: 30 PACKAGE | Refills: 2 | Status: SHIPPED | OUTPATIENT
Start: 2018-06-22

## 2018-06-22 RX ORDER — HYDROCODONE BITARTRATE AND ACETAMINOPHEN 10; 325 MG/1; MG/1
TABLET ORAL
Qty: 60 TAB | Refills: 0 | Status: SHIPPED | OUTPATIENT
Start: 2018-07-02

## 2018-06-22 RX ORDER — NALOXONE HYDROCHLORIDE 2 MG/.4ML
2 INJECTION, SOLUTION INTRAMUSCULAR; SUBCUTANEOUS
Qty: 1 DEVICE | Refills: 0 | Status: SHIPPED | OUTPATIENT
Start: 2018-06-22 | End: 2018-06-22

## 2018-06-22 NOTE — PROGRESS NOTES
Referral date before 2012, source PCP and for chronic widespread pain. HPI:  Remberto Jiménez is a 65 y.o. male here for f/u visit for ongoing evaluation of chronic widespread pain. Pt was last seen here on 5/3/2018. Pt denies interval changes on the character or distribution of pain. Pain is located Ken upper extremities, midline spinal region and lumbosacral belt line and gluteal region. The pain is described as aching and stabbing. Pain at its best is 7/10. Pain at its worse is 9/10. The pain is worsened by standing, stopping moving, standing and playing guitair. Symptoms are relieved by tilt table, walking, exercise, Ice, Heat, chiropractor, message. Pt has tried physical therapy, TENS, Epidural steroid injections, Lidoderm injections with no perceived benefit. Patient states S/P L5-S1 fusion, L4 arthritic. Pt has not tried aquatic therapy, Accunpuncture, Spinal cord stimulator. Patient in process of transferring care. Social History     Social History    Marital status:      Spouse name: N/A    Number of children: N/A    Years of education: N/A     Occupational History    Not on file.      Social History Main Topics    Smoking status: Never Smoker    Smokeless tobacco: Never Used    Alcohol use No    Drug use: No    Sexual activity: Yes     Partners: Female     Other Topics Concern    Not on file     Social History Narrative     Family History   Problem Relation Age of Onset    Hypertension Mother     Stroke Mother     Cancer Mother     Heart Attack Mother     Arthritis-osteo Mother     Hypertension Father     Stroke Father     High Cholesterol Father    [facility] Arthritis-rheumatoid Father     Thyroid Disease Sister     Seizures Sister      No Known Allergies  Past Medical History:   Diagnosis Date    Bipolar 1 disorder, depressed ([address]     Bulging lumbar disc     Chronic lower back pain     Depression     DJD (degenerative joint disease), multiple sites 8/12/2014    Fibromyalgia  Herpes genitalis in men     Hyperlipemia     Insomnia     Insomnia secondary to chronic pain 8/12/2014    LBP (low back pain) 8/12/2014    Lumbar disc herniation 8/12/2014    Lumbar facet arthropathy (HCC) 8/12/2014    Lumbar foraminal stenosis 8/12/2014    Musculoskeletal pain 8/12/2014    Neuropathy     Postlaminectomy syndrome, lumbar region 8/12/2014    Shoulder impingement syndrome 8/12/2014    Spasm of muscle 8/12/2014    Spondylolisthesis of lumbar region 8/12/2014     Past Surgical History:   Procedure Laterality Date    HX BACK SURGERY  Oct 2002    L5 and S1 fusion    HX LAP CHOLECYSTECTOMY  1995    HX SHOULDER ARTHROSCOPY  2007    Right    LAP,CHOLECYSTECTOMY       Current Outpatient Prescriptions on File Prior to Visit   Medication Sig    HYDROcodone-acetaminophen (NORCO)  mg tablet Take 1 Tab by mouth four (4) times daily as needed for Pain (breakthrough) for up to 30 days. Max Daily Amount: 4 Tabs. Indications: Pain    testosterone cypionate (DEPO-TESTOSTERONE IM) 200 mg by IntraMUSCular route Once every 2 weeks.  pyridoxine HCl, vitamin B6, (VITAMIN B-6 PO) Take  by mouth.  DOCUSATE SODIUM PO Take 100 mg by mouth two (2) times a day.  tiZANidine (ZANAFLEX) 2 mg tablet TAKE ONE-HALF TO ONE TABLET BY MOUTH THREE TIMES A DAY AS NEEDED FOR MUSCLE SPASM    ergocalciferol (ERGOCALCIFEROL) 50,000 unit capsule TAKE 1 CAPSULE BY MOUTH ONCE A WEEK FOR 30 DAYS (Patient taking differently: 10,000 Units. TAKE 1 CAPSULE BY MOUTH ONCE A WEEK FOR 30 DAYS)    tizanidine (ZANAFLEX) 2 mg tablet Take 2 mg by mouth three (3) times daily as needed.  ranitidine (ZANTAC) 150 mg tablet Take 150 mg by mouth daily as needed.  venlafaxine-SR (EFFEXOR XR) 150 mg capsule Take 150 mg by mouth daily.  gabapentin (NEURONTIN) 600 mg tablet Take  by mouth three (3) times daily.  ALPRAZolam (XANAX) 0.5 mg tablet Take 0.5 mg by mouth daily as needed.     naloxone 4 mg/actuation spry 4 mg by Nasal route as needed for up to 2 doses. Indications: OPIOID TOXICITY    lidocaine (LIDODERM) 5 % 3 Patches by TransDERmal route every twelve (12) hours. Apply patch to the affected area for 12 hours a day and remove for 12 hours a day.  lidocaine (LIDODERM) 5 %(700 mg/patch) 1 Patch by TransDERmal route every twenty-four (24) hours. No current facility-administered medications on file prior to visit. Review of Systems   Constitutional: Negative for chills, fever, malaise/fatigue and weight loss. HENT: Negative for hearing loss. Eyes: Negative for blurred vision and double vision. Respiratory: Negative for cough, shortness of breath and wheezing. Cardiovascular: Negative for chest pain and palpitations. Gastrointestinal: Positive for abdominal pain and diarrhea. Negative for nausea and vomiting. Musculoskeletal: Positive for back pain, joint pain (Ken shoulder pain ) and myalgias (widepsread pain ). Psychiatric/Behavioral: Positive for depression (ongoing ). Negative for suicidal ideas (denies homicidal ideation ). The patient is not nervous/anxious. Opioid specific risk: Depression, obesity, concurrent benzo use, Bipolar disorder. Opioid specific history: concurrent use of opioids since before 2010, with no opioid holiday. Vitals:    06/22/18 1349   BP: 131/89   Pulse: 70   Resp: 12   Temp: 95.6 °F (35.3 °C)   TempSrc: Oral   Weight: 95.3 kg (210 lb)   Height: 5' 10\" (1.778 m)   PainSc:   8        Imaging: \"\"\"\"11/10/2017 MRI thoracic spine without contrast.  Impression: 1. Multilevel degenerative disc disease and very small posterior disc protrusions T2/3, T5/6, T7/T8, T9/10, and T10/11. Slight cord flattening T5/T6 and T7/8 without significant stenosis. 2.  No intrinsic cord lesion. No acute osseous finding. \"\"\"\"  \"\"\"\"\"07/22/14 MRI LUMB SPINE W WO CONT IMPRESSION:  1. Evidence of prior L5-S1 anterior interbody fusion. No abnormal marrow signal  is seen.  No central stenosis at the postsurgical level. 2. Focal right far lateral/foraminal L3-4 disc protrusion which contacts the  exiting right L3 nerve root. Clinical correlation is recommended. 3. No discrete right L4 nerve root impingement is seen. 4. Slight L3-4 and L4-5 retrolisthesis, perhaps due to facet arthropathy. Consider followup flexion and extension views. 5. No high-grade central stenosis. \"\"\"\"    PE:  Physical Exam    AFVSS, no acute distress, overweight body habitus. A&OXs 3. Normocephalic, atraumatic. Conjugate gaze, clear sclerae. Speech is clear and mood is appropriate for situation. Heart rate regular rate and rhythm, S1-S2 noted, no murmur noted. Lungs clear to auscultation, nonlabored breathing, no acute distress. Gait is within functional limits. Balance is within functional limits. Lumbar active range of motion flexion decreased by 25 % to endrange reproduced primary pain, patient slow to return to upright position. Lumbar extension decreased by 50% of endrange reproduced primary pain. Bilateral SIJ tender to palpation. Patient tender to palpation midline cervical, thoracic, and lumbar spine. Patient tender to every location palpated. LE:   Strength for right lower extremity is 5/5 for hip flexion, knee extension, dorsiflexion, extensor hallucis longus, plantar flexion. Strength for left lower extremity is 5/5 for hip flexion, knee extension, dorsiflexion, extensor hallucis longus, plantar flexion. Sensation to light touch is intact for right L2-S2. Sensation to light touch is intact for left L2, 3, 4 and S2. Decreased sensation in L4 and S1. Muscle Stretch reflexes for right lower extremity are 2+ for L4,  S1.   Muscle Stretch reflexes for left  lower extremity are 2+ for L4, S1.     Negative seated straight leg raise bilaterally. Negative Stinchfield's on the right and a left. Negative FABERs on the  left. Positive on the right. Negative FADIRS on the right.   Positive to the left.     Non organic signs: Yes  -Tenderness  -reaction      Calculated MEQ -220 decreased to 200 at this visit   Naloxone rescue -never got filled, reordered today  Prophylactic bowel program -no  Date of last OCA 1/26/2018  Last UDS 3/23/2017, consistent   date checked today, findings consistent    Primary Care Physician  [place] [id number]  [phone]    Today   Last Visit      ALBERTO - 62%     PGIC -  6 and 3     COMM -did not complete 10    PHQ -- . PHQ over the last two weeks 6/22/2018   PHQ Not Done Active Diagnosis of Depression or Bipolar Disorder   Little interest or pleasure in doing things -   Feeling down, depressed or hopeless -   Total Score PHQ 2 -       Assessment/Plan:     ICD-10-CM ICD-9-CM    1. Chronic midline low back pain, with sciatica presence unspecified M54.5 724.2 HYDROcodone-acetaminophen (NORCO)  mg tablet    G89.29 338.29 lidocaine (LIDODERM) 5 %      REFERRAL TO PHYSICAL THERAPY   2. Encounter for long-term (current) use of high-risk medication Z79.899 V58.69 EKG, 12 LEAD, INITIAL   3. Chronic pain syndrome G89.4 338.4 REFERRAL TO PHYSICAL THERAPY      methadone (DOLOPHINE) 10 mg tablet      DISCONTINUED: methadone (DOLOPHINE) 10 mg tablet   4. DDD (degenerative disc disease), thoracic M51.34 722.51 HYDROcodone-acetaminophen (NORCO)  mg tablet      REFERRAL TO PHYSICAL THERAPY   5. Lumbar facet arthropathy (HCC) M46.96 721.3 REFERRAL TO PHYSICAL THERAPY      DISCONTINUED: methadone (DOLOPHINE) 10 mg tablet   6. Chronic pain of both shoulders M25.511 719.41 HYDROcodone-acetaminophen (NORCO)  mg tablet    G89.29 338.29 REFERRAL TO PHYSICAL THERAPY    M25.512          Current methadone 10 mg take 2 tablets by mouth every 8 hours up to 6 tablets a day pain. Will continue methadone at current dose, and decrease 1 tablet daily at next visit.   Wean to continue every visit thereafter by approximately 10% monthly with an end goal of 0 opioids. With regards to patient's safety and tolerance. Patient to call if he experiences any withdrawal symptoms. Current hydrocodone/acetaminophen 10/325 mg tablet take 1 tablet up to 4 times a day as needed for pain. Decreased to hydrocodone/acetaminophen 10/325 mg tablet, take 1 tablet BID as needed for pain. At next visit will be decreasing methadone. EKG ordered due to currently taking methadone. Order given to patient. Referral ordered for physical therapy aquatic therapy, to address Kinesio phobia, fear avoidance and movement therapies to help empower patient to over come chronic pain. Referred patient to Dr. Christophe Salazar for diagnostic left SIJ injection. Consider pain pshycology, spinal cord stimulator trial, consider x-ray at next visit to rule out LSTV. Follow up ongoing assessment and ongoing development of integrative and comprehensive plan of care for chronic pain. GOALS:  To establish complementary and integrative plan of care to address chronic pain issues while minimizing pharmaceuticals to maximize patient's function improve quality of life. Education:  Patient again educated on the importance of strict compliance with the opioid care agreement while on opioid therapy. Patient also again educated that they should avoid driving while on chronic opioid therapy. Also advised to avoid alcohol and to avoid benzodiazepines while on opioid therapy. Handouts given for sleep health and opioid safety. Patient Homework:        F/u:. Follow-up Disposition:  Return in about 6 days (around 6/28/2018) for 30 mins.

## 2018-06-22 NOTE — MR AVS SNAPSHOT
[address] [facility] [address] 
[phone] Patient: Chaya Marrero MRN: [id number] :1960 Visit Information Date & Time Provider Department Dept. Phone Encounter #  
 2018  2:00 PM Paul Faria NP [address] [place] [address] for Pain Management [id number] Follow-up Instructions Return in about 6 days (around 2018) for 30 mins. Your Appointments 2018  2:00 PM  
CONSULT with Demetria Gonzales MD  
[address] [place] [address] for Pain Management [facility]) Appt Note: Wednesday, 2018 @ 2:00pm [facility])  
 [address] [facility] [address]  
[phone] [facility] [address] [id number] Upcoming Health Maintenance Date Due DTaP/Tdap/Td series (1 - Tdap) 10/24/1982 FOBT Q 1 YEAR AGE 50-75 10/24/2011 MEDICARE YEARLY EXAM 3/14/2018 Influenza Age 5 to Adult 2018 Allergies as of 2018  Review Complete On: 2018 By: Paul Faria NP No Known Allergies Current Immunizations  Never Reviewed No immunizations on file. Not reviewed this visit You Were Diagnosed With   
  
 Codes Comments Chronic midline low back pain, with sciatica presence unspecified    -  Primary ICD-10-CM: M54.5, G89.29 ICD-9-CM: 724.2, 338.29 Encounter for long-term (current) use of high-risk medication     ICD-10-CM: Z79.899 ICD-9-CM: V58.69 Chronic pain syndrome     ICD-10-CM: G89.4 ICD-9-CM: 338.4 DDD (degenerative disc disease), thoracic     ICD-10-CM: M51.34 
ICD-9-CM: 722.51 Lumbar facet arthropathy ([facility]     ICD-10-CM: M46.96 
ICD-9-CM: 592. 3 Chronic pain of both shoulders     ICD-10-CM: M25.511, G89.29, M25.512 ICD-9-CM: 719.41, 338.29 Vitals BP Pulse Temp Resp Height(growth percentile) Weight(growth percentile) 131/89 (BP 1 Location: Right arm, BP Patient Position: Sitting) 70 95.6 °F (35.3 °C) (Oral) 12 5' 10\" (1.778 m) 210 lb (95.3 kg) BMI Smoking Status 30.13 kg/m2 Never Smoker Vitals History BMI and BSA Data Body Mass Index Body Surface Area  
 30.13 kg/m 2 2.17 m 2 Preferred Pharmacy Pharmacy Name Phone [facility] [address] [facility]. Your Updated Medication List  
  
   
This list is accurate as of 6/22/18  3:52 PM.  Always use your most recent med list.  
  
  
  
  
 DEPO-TESTOSTERONE IM  
200 mg by IntraMUSCular route Once every 2 weeks. DOCUSATE SODIUM PO Take 100 mg by mouth two (2) times a day. EFFEXOR  mg capsule Generic drug:  venlafaxine-SR Take 150 mg by mouth daily. ergocalciferol 50,000 unit capsule Commonly known as:  ERGOCALCIFEROL  
TAKE 1 CAPSULE BY MOUTH ONCE A WEEK FOR 30 DAYS  
  
 * HYDROcodone-acetaminophen  mg tablet Commonly known as:  Charisse Agudelo Take 1 Tab by mouth four (4) times daily as needed for Pain (breakthrough) for up to 30 days. Max Daily Amount: 4 Tabs. Indications: Pain  
  
 * HYDROcodone-acetaminophen  mg tablet Commonly known as:  Charisse Agudelo Take one tablet by mouth up to two times a day as needed for pain. Max daily dose 20 mg. Indications: Pain Start taking on:  7/2/2018 * LIDODERM 5 % Generic drug:  lidocaine 1 Patch by TransDERmal route every twenty-four (24) hours. * lidocaine 5 % Commonly known as:  LIDODERM  
3 Patches by TransDERmal route every twelve (12) hours. Apply patch to the affected area for 12 hours a day and remove for 12 hours a day. * lidocaine 5 % Commonly known as:  Sukhi Johnson Apply patch to the affected area for 12 hours a day and remove for 12 hours a day. Indications: Pain  
  
 methadone 10 mg tablet Commonly known as:  DOLOPHINE  
 Take 2 tablets by mouth three times a day. Max daily dose 60 mg. Indications: Chronic Pain, Severe Pain Start taking on:  2018 * naloxone 4 mg/actuation nasal spray Commonly known as:  NARCAN  
4 mg by Nasal route as needed for up to 2 doses. Indications: OPIOID TOXICITY  
  
 * naloxone 2 mg/0.4 mL auto-injector Commonly known as:  EVZIO  
0.4 mL by IntraMUSCular route once as needed for Overdose for up to 1 dose. Send to: [facility] [address] , [facility],  [address]  Indications: OPIATE-INDUCED RESPIRATORY DEPRESSION  
  
 NEURONTIN 600 mg tablet Generic drug:  gabapentin Take  by mouth three (3) times daily. vit B Cmplx 3-FA-Vit C-Biotin 1- mg-mg-mcg tablet Commonly known as:  NEPHRO NORMAN RX Take 1 Tab by mouth daily. VITAMIN B-6 PO Take  by mouth.  
  
 vitamin E 400 unit capsule Commonly known as:  [address] Take 400 Units by mouth daily. XANAX 0.5 mg tablet Generic drug:  ALPRAZolam  
Take 0.5 mg by mouth daily as needed. * ZANAFLEX 2 mg tablet Generic drug:  tiZANidine Take 2 mg by mouth three (3) times daily as needed. * tiZANidine 2 mg tablet Commonly known as:  Hosyoselin Munda TAKE ONE-HALF TO ONE TABLET BY MOUTH THREE TIMES A DAY AS NEEDED FOR MUSCLE SPASM  
  
 ZANTAC 150 mg tablet Generic drug:  raNITIdine Take 150 mg by mouth daily as needed. * Notice: This list has 9 medication(s) that are the same as other medications prescribed for you. Read the directions carefully, and ask your doctor or other care provider to review them with you. Prescriptions Printed Refills  
 naloxone (EVZIO) 2 mg/0.4 mL auto-injector 0 Si.4 mL by IntraMUSCular route once as needed for Overdose for up to 1 dose. Send to: [facility] [address], [facility],  [address]  Indications: OPIATE-INDUCED RESPIRATORY DEPRESSION Class: Print Route: IntraMUSCular HYDROcodone-acetaminophen (NORCO)  mg tablet 0 Starting on: 7/2/2018 Sig: Take one tablet by mouth up to two times a day as needed for pain. Max daily dose 20 mg. Indications: Pain Class: Print  
 methadone (DOLOPHINE) 10 mg tablet 0 Starting on: 7/2/2018 Sig: Take 2 tablets by mouth three times a day. Max daily dose 60 mg. Indications: Chronic Pain, Severe Pain Class: Print Prescriptions Sent to Pharmacy Refills  
 lidocaine (LIDODERM) 5 % 2 Sig: Apply patch to the affected area for 12 hours a day and remove for 12 hours a day. Indications: Pain Class: Normal  
 Pharmacy: [address], [address] [facility] Ph #: [phone] We Performed the Following REFERRAL TO PHYSICAL THERAPY [[id number] Custom] Comments:  
 65 yr old male with chronic widespread pain and S/P L5-S1 fusion. Please evaluated and treat with aquatic therapy please address kineseophobia, fear avoidance and movement therapies to help empower pt to overcome chronic pain. Follow-up Instructions Return in about 6 days (around 6/28/2018) for 30 mins. To-Do List   
 06/22/2018 ECG:  EKG, 12 LEAD, INITIAL Referral Information Referral ID Referred By Referred To  
  
 [id number] Armen [address], [address] [place] [address] Phone: [phone] Fax: [phone] Visits Status Start Date End Date 1 New Request 6/22/18 6/22/19 If your referral has a status of pending review or denied, additional information will be sent to support the outcome of this decision. Patient Instructions Learning About Sleeping Well What does sleeping well mean? Sleeping well means getting enough sleep. How much sleep is enough varies among people.  
The number of hours you sleep is not as important as how you feel when you wake up. If you do not feel refreshed, you probably need more sleep. Another sign of not getting enough sleep is feeling tired during the day. The average total nightly sleep time is 7½ to 8 hours. Healthy adults may need a little more or a little less than this. Why is getting enough sleep important? Getting enough quality sleep is a basic part of good health. When your sleep suffers, your mood and your thoughts can suffer too. You may find yourself feeling more grumpy or stressed. Not getting enough sleep also can lead to serious problems, including injury, accidents, anxiety, and depression. What might cause poor sleeping? Many things can cause sleep problems, including: · Stress. Stress can be caused by fear about a single event, such as giving a speech. Or you may have ongoing stress, such as worry about work or school. · Depression, anxiety, and other mental or emotional conditions. · Changes in your sleep habits or surroundings. This includes changes that happen where you sleep, such as noise, light, or sleeping in a different bed. It also includes changes in your sleep pattern, such as having jet lag or working a late shift. · Health problems, such as pain, breathing problems, and restless legs syndrome. · Lack of regular exercise. How can you help yourself? Here are some tips that may help you sleep more soundly and wake up feeling more refreshed. Your sleeping area · Use your bedroom only for sleeping and sex. A bit of light reading may help you fall asleep. But if it doesn't, do your reading elsewhere in the house. Don't watch TV in bed. · Be sure your bed is big enough to stretch out comfortably, especially if you have a sleep partner. · Keep your bedroom quiet, dark, and cool. Use curtains, blinds, or a sleep mask to block out light. To block out noise, use earplugs, soothing music, or a \"white noise\" machine. Your evening and bedtime routine · Create a relaxing bedtime routine. You might want to take a warm shower or bath, listen to soothing music, or drink a cup of noncaffeinated tea. · Go to bed at the same time every night. And get up at the same time every morning, even if you feel tired. What to avoid · Limit caffeine (coffee, tea, caffeinated sodas) during the day, and don't have any for at least 4 to 6 hours before bedtime. · Don't drink alcohol before bedtime. Alcohol can cause you to wake up more often during the night. · Don't smoke or use tobacco, especially in the evening. Nicotine can keep you awake. · Don't take naps during the day, especially close to bedtime. · Don't lie in bed awake for too long. If you can't fall asleep, or if you wake up in the middle of the night and can't get back to sleep within 15 minutes or so, get out of bed and go to another room until you feel sleepy. · Don't take medicine right before bed that may keep you awake or make you feel hyper or energized. Your doctor can tell you if your medicine may do this and if you can take it earlier in the day. If you can't sleep · Imagine yourself in a peaceful, pleasant scene. Focus on the details and feelings of being in a place that is relaxing. · Get up and do a quiet or boring activity until you feel sleepy. · Don't drink any liquids after 6 p.m. if you wake up often because you have to go to the bathroom. Where can you learn more? Go to http://jerman-elton.info/. Enter L999 in the search box to learn more about \"Learning About Sleeping Well. \" Current as of: May 12, 2017 Content Version: 11.4 © 1427-0556 [employer]. Care instructions adapted under license by [employer] (which disclaims liability or warranty for this information).  If you have questions about a medical condition or this instruction, always ask your healthcare professional. Lars Lechuga [facility] disclaims any warranty or liability for your use of this information. Safe Use of Opioid Pain Medicine: Care Instructions Your Care Instructions Pain is your body's way of warning you that something is wrong. Pain feels different for everybody. Only you can describe your pain. A doctor can suggest or prescribe many types of medicines for pain. These range from over-the-counter medicines like acetaminophen (Tylenol) to powerful medicines called opioids. Examples of opioids are fentanyl, hydrocodone, morphine, and oxycodone. Heroin is an illegal opioid Opioids are strong medicines. They can help you manage pain when you use them the right way. But if you misuse them, they can cause serious harm and even death. For these reasons, doctors are very careful about how they prescribe opioids. If you decide to take opioids, here are some things to remember. · Keep your doctor informed. You can get addicted to opioids. The risk is higher if you have a history of substance use. Your doctor will monitor you closely for signs of misuse and addiction and to figure out when you no longer need to take opioids. · Make a treatment plan. The goal of your plan is to be able to function and do the things you need to do, even if you still have some pain. You might be able to manage your pain with other non-opioid options like physical therapy, relaxation, or over-the-counter pain medicines. · Be aware of the side effects. Opioids can cause serious side effects, such as constipation, dry mouth, and nausea. And over time, you may need a higher dose to get pain relief. This is called tolerance. Your body also gets used to opioids. This is called physical dependence. If you suddenly stop taking them, you may have withdrawal symptoms. The doctor carefully considered what pain medicine is right for you.  You may not have received opioids if your doctor was concerned about drug interactions or your safety, or if he or she had other concerns. It is best to have one doctor or clinic treat your pain. This way you will get the pain medicine that will help you the most. And a doctor will be able to watch for any problems that the medicine might cause. The doctor has checked you carefully, but problems can develop later. If you notice any problems or new symptoms,  get medical treatment right away. Follow-up care is a key part of your treatment and safety. Be sure to make and go to all appointments, and call your doctor if you are having problems. It's also a good idea to know your test results and keep a list of the medicines you take. How can you care for yourself at home? · If you need to take opioids to manage your pain, remember these safety tips. ¨ Follow directions carefully. It's easy to misuse opioids if you take a dose other than what's prescribed by your doctor. This can lead to overdose and even death. Even sharing them with someone they weren't meant for is misuse. ¨ Be cautious. Opioids may affect your judgment and decision making. Do not drive or operate machinery until you can think clearly. Talk with your doctor about when it is safe to drive. ¨ Reduce the risk of drug interactions. Opioids can be dangerous if you take them with alcohol or with certain drugs like sleeping pills and muscle relaxers. Make sure your doctor knows about all the other medicines you take, including over-the-counter medicines. Don't start any new medicines before you talk to your doctor or pharmacist. 
[place] Keep others safe. Store opioids in a safe and secure place. Make sure that pets, children, friends, and family can't get to them. When you're done using opioids, make sure to properly dispose of them.  You can either use a community drug take-back program or your drugstore's mail-back program. If one of these programs isn't available, you can flush opioid skin patches and unused opioid pills down the toilet. ¨ Reduce the risk of overdose. Misuse of opioids can be very dangerous. Protect yourself by asking your doctor about a naloxone rescue kit. It can help you-and even save your life-if you take too much of an opioid. · Try other ways to reduce pain. ¨ Relax, and reduce stress. Relaxation techniques such as deep breathing or meditation can help. ¨ Keep moving. Gentle, daily exercise can help reduce pain over the long run. Try low- or no-impact exercises such as walking, swimming, and stationary biking. Do stretches to stay flexible. ¨ Try heat, cold packs, and massage. ¨ Get enough sleep. Pain can make you tired and drain your energy. Talk with your doctor if you have trouble sleeping because of pain. ¨ Think positive. Your thoughts can affect your pain level. Do things that you enjoy to distract yourself when you have pain instead of focusing on the pain. See a movie, read a book, listen to music, or spend time with a friend. · If you are not taking a prescription pain medicine, ask your doctor if you can take an over-the-counter medicine. When should you call for help? Call your doctor now or seek immediate medical care if: 
? · You have a new kind of pain. ? · You have new symptoms, such as a fever or rash, along with the pain. ? Watch closely for changes in your health, and be sure to contact your doctor if: 
? · You think you might be using too much pain medicine, and you need help to use less or stop. ? · Your pain gets worse. ? · You would like a referral to a doctor or clinic that specializes in pain management. Where can you learn more? Go to http://jerman-elton.info/. Enter R108 in the search box to learn more about \"Safe Use of Opioid Pain Medicine: Care Instructions. \" Current as of: October 14, 2016 Content Version: 11.4 © 0341-6351 [employer], [employer].  Care instructions adapted under license by [address] (which disclaims liability or warranty for this information). If you have questions about a medical condition or this instruction, always ask your healthcare professional. [address] any warranty or liability for your use of this information. Introducing [facility] & HEALTH SERVICES! Dear Mode Elkins: Thank you for requesting a "[employer] account. Our records indicate that you already have an active "[employer] account. You can access your account anytime at [email or web address]. [email or web address]/[employer] Did you know that you can access your hospital and ER discharge instructions at any time in "[employer]? You can also review all of your test results from your hospital stay or ER visit. Additional Information If you have questions, please visit the Frequently Asked Questions section of the "[employer] website at [email or web address] Remember, "[employer] is NOT to be used for urgent needs. For medical emergencies, dial 911. Now available from your iPhone and Android! Please provide this summary of care documentation to your next provider. Your primary care clinician is listed as [facility] Daily. If you have any questions after today's visit, please call [phone].

## 2018-06-22 NOTE — PROGRESS NOTES
Nursing Notes    Patient presents to the office today in follow-up. Patient rates his pain at 8/10 on the numerical pain scale. Reviewed medications with counts as follows:    Rx Date filled Qty Dispensed Pill Count Last Dose Short   norco 10/325 mg 06/03/18 110 44 today no   Methadone 10 mg  06/03/18 180 61 today no                            POC UDS was not performed in office today. Any new labs or imaging since last appointment? NO    Have you been to an emergency room (ER) or urgent care clinic since your last visit? NO            Have you been hospitalized since your last visit? NO     If yes, where, when, and reason for visit? Have you seen or consulted any other health care providers outside of the [address] [place] [address]  since your last visit? NO     If yes, where, when, and reason for visit? Mr. Ute Lund has a reminder for a \"due or due soon\" health maintenance. I have asked that he contact his primary care provider for follow-up on this health maintenance.     PHQ over the last two weeks 6/22/2018   PHQ Not Done Active Diagnosis of Depression or Bipolar Disorder   Little interest or pleasure in doing things -   Feeling down, depressed or hopeless -   Total Score PHQ 2 -

## 2018-06-22 NOTE — PATIENT INSTRUCTIONS
Learning About Sleeping Well  What does sleeping well mean? Sleeping well means getting enough sleep. How much sleep is enough varies among people. The number of hours you sleep is not as important as how you feel when you wake up. If you do not feel refreshed, you probably need more sleep. Another sign of not getting enough sleep is feeling tired during the day. The average total nightly sleep time is 7½ to 8 hours. Healthy adults may need a little more or a little less than this. Why is getting enough sleep important? Getting enough quality sleep is a basic part of good health. When your sleep suffers, your mood and your thoughts can suffer too. You may find yourself feeling more grumpy or stressed. Not getting enough sleep also can lead to serious problems, including injury, accidents, anxiety, and depression. What might cause poor sleeping? Many things can cause sleep problems, including:  · Stress. Stress can be caused by fear about a single event, such as giving a speech. Or you may have ongoing stress, such as worry about work or school. · Depression, anxiety, and other mental or emotional conditions. · Changes in your sleep habits or surroundings. This includes changes that happen where you sleep, such as noise, light, or sleeping in a different bed. It also includes changes in your sleep pattern, such as having jet lag or working a late shift. · Health problems, such as pain, breathing problems, and restless legs syndrome. · Lack of regular exercise. How can you help yourself? Here are some tips that may help you sleep more soundly and wake up feeling more refreshed. Your sleeping area  · Use your bedroom only for sleeping and sex. A bit of light reading may help you fall asleep. But if it doesn't, do your reading elsewhere in the house. Don't watch TV in bed. · Be sure your bed is big enough to stretch out comfortably, especially if you have a sleep partner.   · Keep your bedroom quiet, dark, and cool. Use curtains, blinds, or a sleep mask to block out light. To block out noise, use earplugs, soothing music, or a \"white noise\" machine. Your evening and bedtime routine  · Create a relaxing bedtime routine. You might want to take a warm shower or bath, listen to soothing music, or drink a cup of noncaffeinated tea. · Go to bed at the same time every night. And get up at the same time every morning, even if you feel tired. What to avoid  · Limit caffeine (coffee, tea, caffeinated sodas) during the day, and don't have any for at least 4 to 6 hours before bedtime. · Don't drink alcohol before bedtime. Alcohol can cause you to wake up more often during the night. · Don't smoke or use tobacco, especially in the evening. Nicotine can keep you awake. · Don't take naps during the day, especially close to bedtime. · Don't lie in bed awake for too long. If you can't fall asleep, or if you wake up in the middle of the night and can't get back to sleep within 15 minutes or so, get out of bed and go to another room until you feel sleepy. · Don't take medicine right before bed that may keep you awake or make you feel hyper or energized. Your doctor can tell you if your medicine may do this and if you can take it earlier in the day. If you can't sleep  · Imagine yourself in a peaceful, pleasant scene. Focus on the details and feelings of being in a place that is relaxing. · Get up and do a quiet or boring activity until you feel sleepy. · Don't drink any liquids after 6 p.m. if you wake up often because you have to go to the bathroom. Where can you learn more? Go to http://jerman-elton.info/. Enter K216 in the search box to learn more about \"Learning About Sleeping Well. \"  Current as of: May 12, 2017  Content Version: 11.4  © 3988-9102 [employer], [employer].  Care instructions adapted under license by [employer] (which disclaims liability or warranty for this information). If you have questions about a medical condition or this instruction, always ask your healthcare professional. [address] any warranty or liability for your use of this information. Safe Use of Opioid Pain Medicine: Care Instructions  Your Care Instructions  Pain is your body's way of warning you that something is wrong. Pain feels different for everybody. Only you can describe your pain. A doctor can suggest or prescribe many types of medicines for pain. These range from over-the-counter medicines like acetaminophen (Tylenol) to powerful medicines called opioids. Examples of opioids are fentanyl, hydrocodone, morphine, and oxycodone. Heroin is an illegal opioid  Opioids are strong medicines. They can help you manage pain when you use them the right way. But if you misuse them, they can cause serious harm and even death. For these reasons, doctors are very careful about how they prescribe opioids. If you decide to take opioids, here are some things to remember. · Keep your doctor informed. You can get addicted to opioids. The risk is higher if you have a history of substance use. Your doctor will monitor you closely for signs of misuse and addiction and to figure out when you no longer need to take opioids. · Make a treatment plan. The goal of your plan is to be able to function and do the things you need to do, even if you still have some pain. You might be able to manage your pain with other non-opioid options like physical therapy, relaxation, or over-the-counter pain medicines. · Be aware of the side effects. Opioids can cause serious side effects, such as constipation, dry mouth, and nausea. And over time, you may need a higher dose to get pain relief. This is called tolerance. Your body also gets used to opioids. This is called physical dependence. If you suddenly stop taking them, you may have withdrawal symptoms.   The doctor carefully considered what pain medicine is right for you. You may not have received opioids if your doctor was concerned about drug interactions or your safety, or if he or she had other concerns. It is best to have one doctor or clinic treat your pain. This way you will get the pain medicine that will help you the most. And a doctor will be able to watch for any problems that the medicine might cause. The doctor has checked you carefully, but problems can develop later. If you notice any problems or new symptoms,  get medical treatment right away. Follow-up care is a key part of your treatment and safety. Be sure to make and go to all appointments, and call your doctor if you are having problems. It's also a good idea to know your test results and keep a list of the medicines you take. How can you care for yourself at home? · If you need to take opioids to manage your pain, remember these safety tips. ¨ Follow directions carefully. It's easy to misuse opioids if you take a dose other than what's prescribed by your doctor. This can lead to overdose and even death. Even sharing them with someone they weren't meant for is misuse. ¨ Be cautious. Opioids may affect your judgment and decision making. Do not drive or operate machinery until you can think clearly. Talk with your doctor about when it is safe to drive. ¨ Reduce the risk of drug interactions. Opioids can be dangerous if you take them with alcohol or with certain drugs like sleeping pills and muscle relaxers. Make sure your doctor knows about all the other medicines you take, including over-the-counter medicines. Don't start any new medicines before you talk to your doctor or pharmacist.  [place] Keep others safe. Store opioids in a safe and secure place. Make sure that pets, children, friends, and family can't get to them. When you're done using opioids, make sure to properly dispose of them.  You can either use a community drug take-back program or your drugstore's mail-back program. If one of these programs isn't available, you can flush opioid skin patches and unused opioid pills down the toilet. ¨ Reduce the risk of overdose. Misuse of opioids can be very dangerous. Protect yourself by asking your doctor about a naloxone rescue kit. It can help you-and even save your life-if you take too much of an opioid. · Try other ways to reduce pain. ¨ Relax, and reduce stress. Relaxation techniques such as deep breathing or meditation can help. ¨ Keep moving. Gentle, daily exercise can help reduce pain over the long run. Try low- or no-impact exercises such as walking, swimming, and stationary biking. Do stretches to stay flexible. ¨ Try heat, cold packs, and massage. ¨ Get enough sleep. Pain can make you tired and drain your energy. Talk with your doctor if you have trouble sleeping because of pain. ¨ Think positive. Your thoughts can affect your pain level. Do things that you enjoy to distract yourself when you have pain instead of focusing on the pain. See a movie, read a book, listen to music, or spend time with a friend. · If you are not taking a prescription pain medicine, ask your doctor if you can take an over-the-counter medicine. When should you call for help? Call your doctor now or seek immediate medical care if:  ? · You have a new kind of pain. ? · You have new symptoms, such as a fever or rash, along with the pain. ? Watch closely for changes in your health, and be sure to contact your doctor if:  ? · You think you might be using too much pain medicine, and you need help to use less or stop. ? · Your pain gets worse. ? · You would like a referral to a doctor or clinic that specializes in pain management. Where can you learn more? Go to http://jerman-elton.info/. Enter R108 in the search box to learn more about \"Safe Use of Opioid Pain Medicine: Care Instructions. \"  Current as of: October 14, 2016  Content Version: 11.4  © 1490-4350 [employer]. Care instructions adapted under license by [employer] (which disclaims liability or warranty for this information). If you have questions about a medical condition or this instruction, always ask your healthcare professional. [address] any warranty or liability for your use of this information.

## 2021-03-11 NOTE — PROGRESS NOTES
HISTORY OF PRESENT ILLNESS  Félix Miller is a 65 y.o. male    Mr. Nicci Drew presents for follow up of chronic, widespread pain due to shoulder impingement syndrome, lumbar postlaminectomy syndrome, and knee osteoarthritis. He also has many elements of fibromyalgia, including poor sleep, daytime fatigue, memory problems, and anxiety. He is present with his wife.     This is my first visit with this patient. The patient denies any significant changes in his chronic pain since last f/u. We discussed his current condition and medications in detail today. Mr. Nicci Drew is otherwise doing very well and has no new complaints. He tolerates his medications without side effects. Austen Douglas reports no change in sleep or constipation. Patient understands current practice transition and taper plan in future. Patient is planning on transferring his continued pain management care to another practice. He will sign a medical release form today. I will provide 1 month transition prescription. We will assist patient with any medical records transfer as needed. Current MME dose as of today:233     Last EK2017  QT/QTC: 370/384     Current medication management consists of:hydrocodone/acetaminophen 10/325 mg tablet, take 1 tablet 4 times daily as needed, methadone 10 mg tablet, take 2 tablets every 6 hours   Medications are helping with pain control and quality of life. Bianka Pham is 6-7/10 with medication and 10/10 without.  Pt describes pain as aching, stabbing, and burning. Aggravating factors include standing, sitting, and walking. Relieved with rest, medication, and avoiding painful activities. The patient reports 50% relief with current medications.   Current treatment is helping to improve general activity, mood, walking, sleep, enjoyment of life     Pain Meds and Quality Of Life have been reviewed. Nonpharmacologic therapy and non-opioid pharmacologic therapy were considered.   If opioid therapy is prescribed, this is only if the expected benefits are anticipated to outweigh risks. He  is otherwise doing well with no other complaints today. He denies any adverse events including nausea, vomiting, dizziness, increased constipation, hallucinations, or seizures. The patient reports functional improvement and QOL with pain medication. Vitals:    05/03/18 1252   BP: 158/86   Pulse: 74   Resp: 12   Temp: 97.7 °F (36.5 °C)   TempSrc: Oral   Weight: 97.5 kg (215 lb)   Height: 5' 10\" (1.778 m)   PainSc:   7   PainLoc: Generalized         No Known Allergies    Past Surgical History:   Procedure Laterality Date    HX BACK SURGERY  Oct 2002    L5 and S1 fusion    HX LAP CHOLECYSTECTOMY  1995    HX SHOULDER ARTHROSCOPY  2007    Right    LAP,CHOLECYSTECTOMY           ROS   Constitutional: Negative.    HENT: Positive for neck pain. Negative for hearing loss.    Eyes: Negative.    Respiratory: Negative.    Cardiovascular: Negative.  Negative for chest pain, palpitations and leg swelling. Gastrointestinal: Negative.  Negative for nausea, vomiting, abdominal pain, diarrhea and constipation. Genitourinary: Negative.    Musculoskeletal: Positive for myalgias, back pain and joint pain (R shoulder, knees, hands). Skin: Negative.    Neurological: Positive for tingling (R leg, resolved). Negative for dizziness, seizures, loss of consciousness and headaches. Psychiatric/Behavioral: Positive for depression (on Effexor). Negative for suicidal ideas, hallucinations and memory loss. The patient has insomnia.      Physical Exam   Constitutional: He is oriented to person, place, and time. He appears distressed. HENT:   Head: Normocephalic and atraumatic. Right Ear: External ear normal.   Left Ear: External ear normal.   Nose: Nose normal.   Mouth/Throat: Oropharynx is clear and moist. No oropharyngeal exudate. Eyes: Conjunctivae and EOM are normal. Pupils are equal, round, and reactive to light. Right eye exhibits no discharge.  Left eye exhibits no discharge. No scleral icterus. Neck: Spinous process tenderness and muscular tenderness (spasms) present. Decreased range of motion present. No thyromegaly present. Cardiovascular: Normal rate, regular rhythm and normal heart sounds.    Pulmonary/Chest: Effort normal and breath sounds normal. No respiratory distress. He has no wheezes. He has no rales. Abdominal: Soft. He exhibits no distension. There is no tenderness. There is no rebound and no guarding. Musculoskeletal: He exhibits tenderness at anterolateral aspect of left shoulder. No loss of ROM. Pain with overhead extension. No crepitus noted       Lumbar back: He exhibits decreased range of motion, tenderness, pain and spasm. tenderness at posteromedial aspect of left calcaneous. No swelling or redness noted. No fallen arch noted  Neurological: He is alert and oriented to person, place, and time. He has normal reflexes. No cranial nerve deficit or sensory deficit. He exhibits normal muscle tone. Gait abnormal. Coordination normal. Positive Tinel's sign on left wrist. Negative Phalen's. 5/5 muscle strength of left hand      ASSESSMENT:    1. Chronic pain syndrome    2. Encounter for long-term (current) use of high-risk medication    3. Spondylolisthesis of lumbar region    4. Lumbar foraminal stenosis    5. Lumbar disc herniation    6. Musculoskeletal pain    7. Postlaminectomy syndrome, lumbar region    8. Primary osteoarthritis involving multiple joints    9. Lumbar facet arthropathy (HCC)          COMM:   [address] Prescription Monitoring Program was reviewed which does not demonstrate aberrancies and/or inconsistencies with regard to the historical prescribing of controlled medications to this patient by other providers. Medications were brought to visit today. Pill count was appropriate. When possible, non-drug therapy for chronic pain should be used as a first-line treatment.  Physical therapy exercise regimens, chiropractic manipulation, meditation relaxation techniques, cognitive behavior therapy, acupuncture, yoga, Sheng Chi,  transcutaneous electrical nerve stimulation (TENS), and application of moist heat can help alleviate pain . Explained that realistic expectations and goals with chronic pain management are to maximize function and minimize pain with the understanding that limitations will exist both in the extent of relief that she may achieve, as well as thresholds of mg strengths that we will not exceed. Our role is to help the patient better cope with chronic pain utilizng a multimodal approach. The patients condition and plan were discussed. All questions were answered. The patient agrees with the plan. PLAN / Pt Instructions:  1. Modify current plan with no evidence of addiction or diversion. 2. Stable on current medication without adverse events. 3. Refill hydrocodone/acetaminophen 10/325 mg tablet, take 1 tablet 4 times daily as needed  4. Refill  methadone 10 mg tablet, take 2 tablets every 6 hours   5. Discussed risks of addiction, dependency, and opioid induced hyperalgesia. 6. Reviewed with patient benefits of home exercise, and lifestyle changes to assist the patient in self-management of symptoms. 7. Patient plans on moving his/her pain management care to another provider. 8. He will tentatively schedule for 3 month follow-up. We will assist with medical records transfer as needed. Prescription monitoring program reviewed. POC UDS today. Pain medications prescribed with the objective of pain relief and improved physical and psychosocial function in this patient. DISPOSITION  · Counseled patient on proper use of prescribed medications. · Counseled patient about chronic medical conditions and their relationship to anxiety and depression and recommended mental health support as needed.   · Reviewed with patient self-help tools, home exercise, and lifestyle changes to assist the patient in self-management of symptoms. · Reviewed with patient the treatment plan, goals of treatment plan, and limitations of treatment plan, to include the potential for side effects from medications and procedures. If side effects occur, it is the responsibility of the patient to inform the clinic so that a change in the treatment plan can be made in a safe manner. The patient is advised that stopping prescribed medication may cause an increase in symptoms and possible medication withdrawal symptoms. The patient is informed an emergency room evaluation may be necessary if this occurs. Spent 25 minutes with patient today reviewing the treatment plan, goals of treatment plan, and limitations of the treatment plan, to include the potential for side effects from medications and procedures. More than 50% of the visit time was spent counseling the patient. Marsha Drake PA-C 5/3/2018        Note: Please excuse any typographical errors. Voice recognition software was used for this note and may cause mistakes. I have reviewed and confirmed nurses' notes for patient's medications, allergies, medical history, and surgical history.